# Patient Record
Sex: FEMALE | Race: WHITE | NOT HISPANIC OR LATINO | Employment: OTHER | URBAN - METROPOLITAN AREA
[De-identification: names, ages, dates, MRNs, and addresses within clinical notes are randomized per-mention and may not be internally consistent; named-entity substitution may affect disease eponyms.]

---

## 2023-02-11 PROBLEM — H02.839 DERMATOCHALASIA: Status: ACTIVE | Noted: 2023-02-11

## 2023-02-11 PROBLEM — R63.4 WEIGHT LOSS: Status: ACTIVE | Noted: 2023-02-11

## 2023-02-11 PROBLEM — J31.0 CHRONIC RHINITIS: Status: ACTIVE | Noted: 2023-02-11

## 2023-02-11 PROBLEM — R09.02 HYPOXIA: Status: ACTIVE | Noted: 2023-02-11

## 2023-02-11 PROBLEM — R07.9 CHEST PAIN AT REST: Status: ACTIVE | Noted: 2023-02-11

## 2023-02-11 PROBLEM — J34.89 CONCHA BULLOSA: Status: ACTIVE | Noted: 2023-02-11

## 2023-02-11 PROBLEM — R41.3 MEMORY LOSS: Status: ACTIVE | Noted: 2023-02-11

## 2023-02-11 PROBLEM — J44.9 COPD (CHRONIC OBSTRUCTIVE PULMONARY DISEASE) (MULTI): Status: ACTIVE | Noted: 2023-02-11

## 2023-02-11 PROBLEM — J18.9 PNEUMONIA: Status: ACTIVE | Noted: 2023-02-11

## 2023-02-11 PROBLEM — E87.1 HYPONATREMIA: Status: ACTIVE | Noted: 2023-02-11

## 2023-02-11 PROBLEM — R00.2 PALPITATIONS: Status: ACTIVE | Noted: 2023-02-11

## 2023-02-11 PROBLEM — K21.9 LPRD (LARYNGOPHARYNGEAL REFLUX DISEASE): Status: ACTIVE | Noted: 2023-02-11

## 2023-02-11 PROBLEM — R09.81 NASAL CONGESTION: Status: ACTIVE | Noted: 2023-02-11

## 2023-02-11 PROBLEM — E83.52 HYPERCALCEMIA: Status: ACTIVE | Noted: 2023-02-11

## 2023-02-11 PROBLEM — E83.42 HYPOMAGNESEMIA: Status: ACTIVE | Noted: 2023-02-11

## 2023-02-11 PROBLEM — E55.9 VITAMIN D DEFICIENCY: Status: ACTIVE | Noted: 2023-02-11

## 2023-02-11 PROBLEM — I44.7 LBBB (LEFT BUNDLE BRANCH BLOCK): Status: ACTIVE | Noted: 2023-02-11

## 2023-02-11 PROBLEM — M81.0 OSTEOPOROSIS: Status: ACTIVE | Noted: 2023-02-11

## 2023-02-11 PROBLEM — M10.9 GOUT: Status: ACTIVE | Noted: 2023-02-11

## 2023-02-11 PROBLEM — E78.5 HYPERLIPIDEMIA: Status: ACTIVE | Noted: 2023-02-11

## 2023-02-11 PROBLEM — R91.8 GROUND GLASS OPACITY PRESENT ON IMAGING OF LUNG: Status: ACTIVE | Noted: 2023-02-11

## 2023-02-11 PROBLEM — I10 HYPERTENSION: Status: ACTIVE | Noted: 2023-02-11

## 2023-02-11 PROBLEM — L85.3 DRY SKIN DERMATITIS: Status: ACTIVE | Noted: 2023-02-11

## 2023-02-11 PROBLEM — R06.02 SOB (SHORTNESS OF BREATH) ON EXERTION: Status: ACTIVE | Noted: 2023-02-11

## 2023-02-11 PROBLEM — R73.09 ELEVATED GLUCOSE: Status: ACTIVE | Noted: 2023-02-11

## 2023-02-11 PROBLEM — G47.33 OSA (OBSTRUCTIVE SLEEP APNEA): Status: ACTIVE | Noted: 2023-02-11

## 2023-02-11 PROBLEM — I65.23 ATHEROSCLEROSIS OF BOTH CAROTID ARTERIES: Status: ACTIVE | Noted: 2023-02-11

## 2023-02-11 PROBLEM — S81.801A WOUND OF RIGHT LEG: Status: ACTIVE | Noted: 2023-02-11

## 2023-03-08 ENCOUNTER — OFFICE VISIT (OUTPATIENT)
Dept: PRIMARY CARE | Facility: CLINIC | Age: 83
End: 2023-03-08
Payer: MEDICARE

## 2023-03-08 VITALS
BODY MASS INDEX: 18.46 KG/M2 | TEMPERATURE: 97.6 F | WEIGHT: 104.2 LBS | SYSTOLIC BLOOD PRESSURE: 130 MMHG | DIASTOLIC BLOOD PRESSURE: 80 MMHG

## 2023-03-08 DIAGNOSIS — M25.512 CHRONIC LEFT SHOULDER PAIN: ICD-10-CM

## 2023-03-08 DIAGNOSIS — R73.09 ELEVATED GLUCOSE: ICD-10-CM

## 2023-03-08 DIAGNOSIS — R63.4 WEIGHT LOSS: ICD-10-CM

## 2023-03-08 DIAGNOSIS — G89.29 CHRONIC LEFT SHOULDER PAIN: ICD-10-CM

## 2023-03-08 DIAGNOSIS — R06.02 SOB (SHORTNESS OF BREATH) ON EXERTION: ICD-10-CM

## 2023-03-08 DIAGNOSIS — I10 HYPERTENSION, UNSPECIFIED TYPE: ICD-10-CM

## 2023-03-08 DIAGNOSIS — R09.02 HYPOXIA: ICD-10-CM

## 2023-03-08 DIAGNOSIS — M1A.9XX0 CHRONIC GOUT WITHOUT TOPHUS, UNSPECIFIED CAUSE, UNSPECIFIED SITE: ICD-10-CM

## 2023-03-08 DIAGNOSIS — J44.9 CHRONIC OBSTRUCTIVE PULMONARY DISEASE, UNSPECIFIED COPD TYPE (MULTI): ICD-10-CM

## 2023-03-08 DIAGNOSIS — E46 PROTEIN-CALORIE MALNUTRITION, UNSPECIFIED SEVERITY (MULTI): Primary | ICD-10-CM

## 2023-03-08 PROBLEM — R00.2 PALPITATIONS: Status: RESOLVED | Noted: 2023-02-11 | Resolved: 2023-03-08

## 2023-03-08 PROBLEM — H02.839 DERMATOCHALASIA: Status: RESOLVED | Noted: 2023-02-11 | Resolved: 2023-03-08

## 2023-03-08 PROBLEM — R07.9 CHEST PAIN AT REST: Status: RESOLVED | Noted: 2023-02-11 | Resolved: 2023-03-08

## 2023-03-08 PROBLEM — J31.0 CHRONIC RHINITIS: Status: RESOLVED | Noted: 2023-02-11 | Resolved: 2023-03-08

## 2023-03-08 PROBLEM — S81.801A WOUND OF RIGHT LEG: Status: RESOLVED | Noted: 2023-02-11 | Resolved: 2023-03-08

## 2023-03-08 PROCEDURE — 3075F SYST BP GE 130 - 139MM HG: CPT | Performed by: INTERNAL MEDICINE

## 2023-03-08 PROCEDURE — 1160F RVW MEDS BY RX/DR IN RCRD: CPT | Performed by: INTERNAL MEDICINE

## 2023-03-08 PROCEDURE — 99213 OFFICE O/P EST LOW 20 MIN: CPT | Performed by: INTERNAL MEDICINE

## 2023-03-08 PROCEDURE — 1159F MED LIST DOCD IN RCRD: CPT | Performed by: INTERNAL MEDICINE

## 2023-03-08 PROCEDURE — 1036F TOBACCO NON-USER: CPT | Performed by: INTERNAL MEDICINE

## 2023-03-08 PROCEDURE — 3079F DIAST BP 80-89 MM HG: CPT | Performed by: INTERNAL MEDICINE

## 2023-03-08 NOTE — PROGRESS NOTES
Subjective   Patient ID: Jackie Matthews is a 82 y.o. female who presents for Follow-up (1 MONTH FU).    HPI     Review of Systems   All other systems reviewed and are negative.      Objective   /80   Temp 36.4 °C (97.6 °F)   Wt 47.3 kg (104 lb 3.2 oz)   BMI 18.46 kg/m²     Physical Exam    Assessment/Plan         82-year-old woman presenting with constellation of symptoms. Very poor historian, difficult to ascertain what she is specifically complaining of. Certainly a component of dyspnea as well as weight loss and loss of appetite. Wt a little better. CASTILLO better, on Advair x 4 weeks, however self discontinued LABA.  We will resume LABA and follow-up 6 to 8 weeks.  rx to pt. reviewed. F/up . BP good w/ amlodipine 5 mg. Reviewed with patient and daughter. Unable to tolerate mirtazapine --> ? why, she will likely try again (thinks she actually didn't try). Normal laboratories. Echocardiogram relatively normal. Asked patient to go to the ED any new or progressive symptoms.  con't Ensure bid.     From previous notes:  #1 PNA- resolved, follow-up CT per pulmonology  #2 hyponatremia- normal on retest  #3 hypomag- normal  #4 COPD-albuterol as needed. Resume LABA.  Continue Advair. Follow-up pulmonology  #5 EtOH-reviewed importance of cessation. remains off.   #6 thrombocytosis- resolved  #7 hearing loss/nasal obstruction-consult ENT  #8 severe JACLYN- f/u sleep Med, stressed importance and risk of untreated JACLYN  #9 dysgusia-reviewed.  Recommend ENT evaluation, patient declines.    #1 osteoporosis-began rx 11/18. reviewed. Given fracture with osteopenia => osteoporosis. Discussed options. We will con't alendronate and continue for 5 years. Spent significant time reviewing risks of this medicine including insufficiency fracture. retest DEXA 2 yrs  #2 impaired fasting blood sugar-reviewed at length. Stressed importance of routine regular exercise, low sugar reduced carbohydrate diet. Recheck   #3 vitamin D deficiency-  vitamin D 2000 units over-the-counter daily. Reviewed  #4 gout-rare episodes. Treated with colchicine. Consider allopurinol in the future.  #5 hyperlipidemia- fair. Diet and exercise. Retest  #6 carotid artery stenosis-consider follow-up scan 8/23  #7 hypertension-good control.   #8 left bundle branch block-normal stress test   #9 painful foot/hammer toe- c/s ortho  #10 h/o pelvic fx- resolved. no pain.   #11 LLL PNA- resolved  #12 hyponatremia-resolved  #13 palpitations- resolved.   #14 mild AI- f/u echo 1 year  #15 memory impairment- much improved w/ d/c EtOH. rec neuro eval, she declines today  Has order at home for shingles vaccine--> reviewed importance  Last mammogram 5/22  f/u 1 mth    covid booster pending, reviewed

## 2023-03-16 DIAGNOSIS — I10 PRIMARY HYPERTENSION: Primary | ICD-10-CM

## 2023-03-16 RX ORDER — METOPROLOL SUCCINATE 25 MG/1
TABLET, EXTENDED RELEASE ORAL
Qty: 45 TABLET | Refills: 0 | Status: SHIPPED | OUTPATIENT
Start: 2023-03-16 | End: 2023-07-27

## 2023-04-08 DIAGNOSIS — M85.80 OSTEOPENIA, UNSPECIFIED LOCATION: Primary | ICD-10-CM

## 2023-04-10 RX ORDER — ALENDRONATE SODIUM 70 MG/1
TABLET ORAL
Qty: 12 TABLET | Refills: 0 | Status: SHIPPED | OUTPATIENT
Start: 2023-04-10 | End: 2023-07-05

## 2023-05-13 DIAGNOSIS — E78.5 HYPERLIPIDEMIA, UNSPECIFIED HYPERLIPIDEMIA TYPE: Primary | ICD-10-CM

## 2023-05-15 RX ORDER — PRAVASTATIN SODIUM 20 MG/1
TABLET ORAL
Qty: 90 TABLET | Refills: 0 | Status: SHIPPED | OUTPATIENT
Start: 2023-05-15 | End: 2024-02-13

## 2023-05-26 DIAGNOSIS — I10 PRIMARY HYPERTENSION: Primary | ICD-10-CM

## 2023-05-26 RX ORDER — AMLODIPINE BESYLATE 10 MG/1
TABLET ORAL
Qty: 90 TABLET | Refills: 0 | Status: SHIPPED | OUTPATIENT
Start: 2023-05-26 | End: 2024-04-02

## 2023-06-08 ENCOUNTER — OFFICE VISIT (OUTPATIENT)
Dept: PRIMARY CARE | Facility: CLINIC | Age: 83
End: 2023-06-08
Payer: MEDICARE

## 2023-06-08 VITALS
SYSTOLIC BLOOD PRESSURE: 122 MMHG | TEMPERATURE: 97.8 F | DIASTOLIC BLOOD PRESSURE: 80 MMHG | WEIGHT: 106.8 LBS | BODY MASS INDEX: 18.92 KG/M2

## 2023-06-08 DIAGNOSIS — E78.5 HYPERLIPIDEMIA, UNSPECIFIED HYPERLIPIDEMIA TYPE: ICD-10-CM

## 2023-06-08 DIAGNOSIS — R06.02 SOB (SHORTNESS OF BREATH) ON EXERTION: ICD-10-CM

## 2023-06-08 DIAGNOSIS — E87.1 HYPONATREMIA: ICD-10-CM

## 2023-06-08 DIAGNOSIS — R73.09 ELEVATED GLUCOSE: ICD-10-CM

## 2023-06-08 DIAGNOSIS — R91.8 GROUND GLASS OPACITY PRESENT ON IMAGING OF LUNG: Primary | ICD-10-CM

## 2023-06-08 DIAGNOSIS — R41.3 MEMORY LOSS: ICD-10-CM

## 2023-06-08 DIAGNOSIS — J44.9 CHRONIC OBSTRUCTIVE PULMONARY DISEASE, UNSPECIFIED COPD TYPE (MULTI): ICD-10-CM

## 2023-06-08 PROCEDURE — 1036F TOBACCO NON-USER: CPT | Performed by: INTERNAL MEDICINE

## 2023-06-08 PROCEDURE — 99213 OFFICE O/P EST LOW 20 MIN: CPT | Performed by: INTERNAL MEDICINE

## 2023-06-08 PROCEDURE — 1159F MED LIST DOCD IN RCRD: CPT | Performed by: INTERNAL MEDICINE

## 2023-06-08 PROCEDURE — 1160F RVW MEDS BY RX/DR IN RCRD: CPT | Performed by: INTERNAL MEDICINE

## 2023-06-08 PROCEDURE — 3079F DIAST BP 80-89 MM HG: CPT | Performed by: INTERNAL MEDICINE

## 2023-06-08 PROCEDURE — 3074F SYST BP LT 130 MM HG: CPT | Performed by: INTERNAL MEDICINE

## 2023-06-08 ASSESSMENT — PATIENT HEALTH QUESTIONNAIRE - PHQ9
2. FEELING DOWN, DEPRESSED OR HOPELESS: NOT AT ALL
SUM OF ALL RESPONSES TO PHQ9 QUESTIONS 1 AND 2: 0
1. LITTLE INTEREST OR PLEASURE IN DOING THINGS: NOT AT ALL

## 2023-06-08 NOTE — PROGRESS NOTES
"Subjective   Patient ID: Jackie Matthews is a 82 y.o. female who presents for Follow-up.    HPI   Overall feels well.  Note dated 3/8/2023 reviewed.  Feels much improved.  Increased exercise.  Increased activity.  No shortness of breath.  Self discontinued Breo Ellipta.  Denies any shortness of breath.  Has follow-up pending/do with pulmonologist.  Mood overall good.  Feels memory has improved.  Appetite improved.  In general feels \"great \".  Review of Systems   All other systems reviewed and are negative.      Objective   /80   Temp 36.6 °C (97.8 °F)   Wt 48.4 kg (106 lb 12.8 oz)   BMI 18.92 kg/m²     Physical Exam  Constitutional:       Appearance: Normal appearance.   Cardiovascular:      Rate and Rhythm: Normal rate and regular rhythm.      Pulses: Normal pulses.      Heart sounds: Normal heart sounds. No murmur heard.  Pulmonary:      Effort: Pulmonary effort is normal. No respiratory distress.      Breath sounds: Normal breath sounds. No wheezing, rhonchi or rales.   Neurological:      Mental Status: She is alert.   Psychiatric:         Mood and Affect: Mood normal.         Behavior: Behavior normal.         Thought Content: Thought content normal.         Judgment: Judgment normal.         Assessment/Plan         Very poor historian, difficult to ascertain what she is specifically complaining of. Certainly a component of dyspnea as well as weight loss and loss of appetite. Wt a little better. CASTILLO better, on Advair x 4 weeks, however self discontinued LABA.  We will resume LABA and follow-up 6 to 8 weeks.  rx to pt. reviewed. F/up . BP good w/ amlodipine 5 mg. Reviewed with patient and daughter. Unable to tolerate mirtazapine --> ? why, she will likely try again (thinks she actually didn't try). Normal laboratories. Echocardiogram relatively normal. Asked patient to go to the ED any new or progressive symptoms.  con't Ensure bid.     From previous notes:  #1 PNA- resolved, follow-up CT per " pulmonology due.  I reviewed need w/ pt.  She will call and schedule.  Stressed importance.   #2 hyponatremia- normal on retest  #3 hypomag- normal  #4 COPD-albuterol as needed. Resume LABA.  Continue Advair. Follow-up pulmonology, asked pt to sched f/u  appt  #5 EtOH-reviewed importance of cessation. remains off.   #6 thrombocytosis- resolved  #7 hearing loss/nasal obstruction-consult ENT  #8 severe JACLYN- f/u sleep Med, stressed importance and risk of untreated JACLYN  #9 dysgusia-reviewed.  Recommend ENT evaluation, patient declines.    #1 osteoporosis-began rx 11/18. reviewed. Given fracture with osteopenia => osteoporosis. Discussed options. We will con't alendronate and continue for 5 years. Spent significant time reviewing risks of this medicine including insufficiency fracture. retest DEXA 2 yrs  #2 impaired fasting blood sugar-reviewed at length. Stressed importance of routine regular exercise, low sugar reduced carbohydrate diet. Recheck   #3 vitamin D deficiency- vitamin D 2000 units over-the-counter daily. Reviewed  #4 gout-rare episodes. Treated with colchicine. Consider allopurinol in the future.  #5 hyperlipidemia- fair. Diet and exercise. Retest  #6 carotid artery stenosis-consider follow-up scan 8/23  #7 hypertension-good control.   #8 left bundle branch block-normal stress test   #9 painful foot/hammer toe- c/s ortho  #10 h/o pelvic fx- resolved. no pain.   #11 LLL PNA- resolved  #12 hyponatremia-resolved  #13 palpitations- resolved.   #14 mild AI- f/u echo 1 year  #15 memory impairment- much improved w/ d/c EtOH. rec neuro eval, she declines today  Has order at home for shingles vaccine--> reviewed importance  Last mammogram 5/22  f/u 1 mth    covid booster pending, reviewed

## 2023-06-08 NOTE — PATIENT INSTRUCTIONS
Please schedule the f/u  chest CT scan that your pulmonologist ordered.    Lets get back together in about 3 months

## 2023-06-15 DIAGNOSIS — J44.9 CHRONIC OBSTRUCTIVE PULMONARY DISEASE, UNSPECIFIED COPD TYPE (MULTI): Primary | ICD-10-CM

## 2023-06-15 RX ORDER — ALBUTEROL SULFATE 90 UG/1
AEROSOL, METERED RESPIRATORY (INHALATION)
Qty: 25.5 G | Refills: 0 | Status: SHIPPED | OUTPATIENT
Start: 2023-06-15 | End: 2023-09-11

## 2023-07-05 DIAGNOSIS — M85.80 OSTEOPENIA, UNSPECIFIED LOCATION: ICD-10-CM

## 2023-07-05 RX ORDER — ALENDRONATE SODIUM 70 MG/1
TABLET ORAL
Qty: 12 TABLET | Refills: 0 | Status: SHIPPED | OUTPATIENT
Start: 2023-07-05 | End: 2023-09-26

## 2023-07-24 ENCOUNTER — TELEPHONE (OUTPATIENT)
Dept: PRIMARY CARE | Facility: CLINIC | Age: 83
End: 2023-07-24

## 2023-07-24 NOTE — TELEPHONE ENCOUNTER
Patient called with concerns of a rash on her neck, going into her ear, and down her left arm. I spoke with Dr. Brunner and he advised that the patient seek care at the emergency room. I am trying to reach the patient to let her know this.- I left a message for her to call the office.

## 2023-07-25 ENCOUNTER — OFFICE VISIT (OUTPATIENT)
Dept: PRIMARY CARE | Facility: CLINIC | Age: 83
End: 2023-07-25
Payer: MEDICARE

## 2023-07-25 VITALS
WEIGHT: 106.6 LBS | TEMPERATURE: 98.2 F | SYSTOLIC BLOOD PRESSURE: 144 MMHG | DIASTOLIC BLOOD PRESSURE: 74 MMHG | BODY MASS INDEX: 18.88 KG/M2

## 2023-07-25 DIAGNOSIS — J44.9 CHRONIC OBSTRUCTIVE PULMONARY DISEASE, UNSPECIFIED COPD TYPE (MULTI): ICD-10-CM

## 2023-07-25 DIAGNOSIS — L30.9 DERMATITIS: Primary | ICD-10-CM

## 2023-07-25 PROCEDURE — 3078F DIAST BP <80 MM HG: CPT | Performed by: INTERNAL MEDICINE

## 2023-07-25 PROCEDURE — 99213 OFFICE O/P EST LOW 20 MIN: CPT | Performed by: INTERNAL MEDICINE

## 2023-07-25 PROCEDURE — 3077F SYST BP >= 140 MM HG: CPT | Performed by: INTERNAL MEDICINE

## 2023-07-25 PROCEDURE — 1126F AMNT PAIN NOTED NONE PRSNT: CPT | Performed by: INTERNAL MEDICINE

## 2023-07-25 PROCEDURE — 1159F MED LIST DOCD IN RCRD: CPT | Performed by: INTERNAL MEDICINE

## 2023-07-25 PROCEDURE — 1160F RVW MEDS BY RX/DR IN RCRD: CPT | Performed by: INTERNAL MEDICINE

## 2023-07-25 PROCEDURE — 1036F TOBACCO NON-USER: CPT | Performed by: INTERNAL MEDICINE

## 2023-07-25 RX ORDER — MIRTAZAPINE 7.5 MG/1
7.5 TABLET, FILM COATED ORAL NIGHTLY
COMMUNITY
Start: 2023-01-18 | End: 2023-07-25 | Stop reason: ALTCHOICE

## 2023-07-26 RX ORDER — METHYLPREDNISOLONE 4 MG/1
TABLET ORAL
Qty: 21 TABLET | Refills: 0 | Status: SHIPPED | OUTPATIENT
Start: 2023-07-26 | End: 2023-08-02

## 2023-07-26 RX ORDER — TRIAMCINOLONE ACETONIDE 1 MG/G
CREAM TOPICAL 2 TIMES DAILY
Qty: 30 G | Refills: 5 | Status: SHIPPED | OUTPATIENT
Start: 2023-07-26 | End: 2024-06-04 | Stop reason: ALTCHOICE

## 2023-07-26 NOTE — PROGRESS NOTES
Subjective   Patient ID: Jackie Matthews is a 82 y.o. female who presents for Rash (Face, arms, neck).    HPI     Review of Systems    Objective   /74 (BP Location: Right arm, Patient Position: Sitting, BP Cuff Size: Adult)   Temp 36.8 °C (98.2 °F) (Skin)   Wt 48.4 kg (106 lb 9.6 oz)   BMI 18.88 kg/m²     Physical Exam    Assessment/Plan   Problem List Items Addressed This Visit       COPD (chronic obstructive pulmonary disease) (CMS/[facility])

## 2023-07-27 DIAGNOSIS — I10 PRIMARY HYPERTENSION: ICD-10-CM

## 2023-07-27 RX ORDER — METOPROLOL SUCCINATE 25 MG/1
12.5 TABLET, EXTENDED RELEASE ORAL DAILY
Qty: 45 TABLET | Refills: 0 | Status: SHIPPED | OUTPATIENT
Start: 2023-07-27 | End: 2023-11-13

## 2023-09-11 DIAGNOSIS — J44.9 CHRONIC OBSTRUCTIVE PULMONARY DISEASE, UNSPECIFIED COPD TYPE (MULTI): ICD-10-CM

## 2023-09-11 RX ORDER — ALBUTEROL SULFATE 90 UG/1
AEROSOL, METERED RESPIRATORY (INHALATION)
Qty: 25.5 G | Refills: 0 | Status: SHIPPED | OUTPATIENT
Start: 2023-09-11

## 2023-09-19 ENCOUNTER — APPOINTMENT (OUTPATIENT)
Dept: PRIMARY CARE | Facility: CLINIC | Age: 83
End: 2023-09-19
Payer: MEDICARE

## 2023-09-19 ENCOUNTER — OFFICE VISIT (OUTPATIENT)
Dept: PRIMARY CARE | Facility: CLINIC | Age: 83
End: 2023-09-19
Payer: MEDICARE

## 2023-09-19 DIAGNOSIS — I65.23 ATHEROSCLEROSIS OF BOTH CAROTID ARTERIES: Primary | ICD-10-CM

## 2023-09-19 DIAGNOSIS — E78.5 HYPERLIPIDEMIA, UNSPECIFIED HYPERLIPIDEMIA TYPE: ICD-10-CM

## 2023-09-19 DIAGNOSIS — Z23 IMMUNIZATION DUE: ICD-10-CM

## 2023-09-19 DIAGNOSIS — R73.09 ELEVATED GLUCOSE: ICD-10-CM

## 2023-09-19 DIAGNOSIS — Z12.39 BREAST SCREENING: ICD-10-CM

## 2023-09-19 DIAGNOSIS — Z12.31 ENCOUNTER FOR SCREENING MAMMOGRAM FOR MALIGNANT NEOPLASM OF BREAST: ICD-10-CM

## 2023-09-19 DIAGNOSIS — Z00.00 REGULAR CHECK-UP: ICD-10-CM

## 2023-09-19 DIAGNOSIS — I10 HYPERTENSION, UNSPECIFIED TYPE: ICD-10-CM

## 2023-09-19 PROCEDURE — 1160F RVW MEDS BY RX/DR IN RCRD: CPT | Performed by: INTERNAL MEDICINE

## 2023-09-19 PROCEDURE — 1126F AMNT PAIN NOTED NONE PRSNT: CPT | Performed by: INTERNAL MEDICINE

## 2023-09-19 PROCEDURE — 1159F MED LIST DOCD IN RCRD: CPT | Performed by: INTERNAL MEDICINE

## 2023-09-19 PROCEDURE — 3079F DIAST BP 80-89 MM HG: CPT | Performed by: INTERNAL MEDICINE

## 2023-09-19 PROCEDURE — 99213 OFFICE O/P EST LOW 20 MIN: CPT | Performed by: INTERNAL MEDICINE

## 2023-09-19 PROCEDURE — 90662 IIV NO PRSV INCREASED AG IM: CPT | Performed by: INTERNAL MEDICINE

## 2023-09-19 PROCEDURE — 1036F TOBACCO NON-USER: CPT | Performed by: INTERNAL MEDICINE

## 2023-09-19 PROCEDURE — G0008 ADMIN INFLUENZA VIRUS VAC: HCPCS | Performed by: INTERNAL MEDICINE

## 2023-09-19 PROCEDURE — G0439 PPPS, SUBSEQ VISIT: HCPCS | Performed by: INTERNAL MEDICINE

## 2023-09-19 PROCEDURE — 1170F FXNL STATUS ASSESSED: CPT | Performed by: INTERNAL MEDICINE

## 2023-09-19 PROCEDURE — 3075F SYST BP GE 130 - 139MM HG: CPT | Performed by: INTERNAL MEDICINE

## 2023-09-19 ASSESSMENT — ENCOUNTER SYMPTOMS
DEPRESSION: 0
LOSS OF SENSATION IN FEET: 0
OCCASIONAL FEELINGS OF UNSTEADINESS: 0

## 2023-09-19 NOTE — PROGRESS NOTES
Subjective   Reason for Visit: Jackie Matthews is an 82 y.o. female here for a Medicare Wellness visit.          Reviewed all medications by prescribing practitioner or clinical pharmacist (such as prescriptions, OTCs, herbal therapies and supplements) and documented in the medical record.    HPI  Overall well.  No shortness of breath.  Energy good.  No real cough.  Following with pulmonology.  Active.  Appetite good, weight trending up.  Patient Care Team:  Ana Rosa Brunner MD as PCP - General  Ana Rosa Brunner MD as PCP - [employer] PCP     Review of Systems   All other systems reviewed and are negative.      Objective   Vitals:  /84 (BP Location: Left arm, Patient Position: Sitting, BP Cuff Size: Adult)   Temp 36.4 °C (97.5 °F) (Skin)   Wt 50.9 kg (112 lb 3.2 oz)   BMI 19.88 kg/m²       Physical Exam  Constitutional:       Appearance: Normal appearance.   Cardiovascular:      Rate and Rhythm: Normal rate and regular rhythm.      Pulses: Normal pulses.      Heart sounds: Normal heart sounds. No murmur heard.  Pulmonary:      Effort: Pulmonary effort is normal. No respiratory distress.      Breath sounds: Normal breath sounds. No wheezing, rhonchi or rales.   Neurological:      Mental Status: She is alert.   Psychiatric:         Mood and Affect: Mood normal.         Behavior: Behavior normal.         Thought Content: Thought content normal.         Judgment: Judgment normal.         Assessment/Plan   Problem List Items Addressed This Visit    None  Visit Diagnoses       Immunization due              #1 PNA- resolved.     #2 hyponatremia- normal on retest  #3 hypomag- normal  #4 COPD-albuterol as needed.  Continue LABA.  Continue Advair. Follow-up pulmonology, asked pt to sched f/u  appt  #5 EtOH-reviewed importance of cessation. remains off.   #6 thrombocytosis- resolved, retest  #7 hearing loss/nasal obstruction-consult ENT  #8 severe JACLYN- f/u sleep Med, stressed importance and risk of untreated  JACLYN  #9 dysgusia-reviewed.  Recommend ENT evaluation, patient declines.     #1 osteoporosis-began rx 11/18. reviewed. Given fracture with osteopenia => osteoporosis. Discussed options. We will con't alendronate and continue for 5 years. Spent significant time reviewing risks of this medicine including insufficiency fracture. retest DEXA 2 yrs  #2 impaired fasting blood sugar-reviewed at length. Stressed importance of routine regular exercise, low sugar reduced carbohydrate diet. Recheck   #3 vitamin D deficiency- vitamin D 2000 units over-the-counter daily. Reviewed  #4 gout-rare episodes. Treated with colchicine. Consider allopurinol in the future.  #5 hyperlipidemia- fair. Diet and exercise. Retest  #6 carotid artery stenosis- follow-up scan   #7 hypertension-good control.   #8 left bundle branch block-normal stress test   #9 painful foot/hammer toe- c/s ortho  #10 h/o pelvic fx- resolved. no pain.   #11  Pulmonary nodule-status post antibiotics.  Follow-up CT scan as per pulmonology.  Reviewed importance of follow-up with pulmonology, due early November.  Reviewed with patient.    #12 hyponatremia-resolved  #13 palpitations- resolved.   #14 mild AI- f/u echo 1 year  #15 memory impairment- much improved w/ d/c EtOH. rec neuro eval, she declines today  Has order at home for shingles vaccine--> reviewed importance  Last mammogram 5/22  f/u 1 mth     covid booster pending, reviewed

## 2023-09-20 VITALS
BODY MASS INDEX: 19.88 KG/M2 | DIASTOLIC BLOOD PRESSURE: 84 MMHG | TEMPERATURE: 97.5 F | WEIGHT: 112.2 LBS | SYSTOLIC BLOOD PRESSURE: 138 MMHG

## 2023-09-20 ASSESSMENT — PATIENT HEALTH QUESTIONNAIRE - PHQ9
SUM OF ALL RESPONSES TO PHQ9 QUESTIONS 1 AND 2: 0
2. FEELING DOWN, DEPRESSED OR HOPELESS: NOT AT ALL
1. LITTLE INTEREST OR PLEASURE IN DOING THINGS: NOT AT ALL

## 2023-09-20 ASSESSMENT — ACTIVITIES OF DAILY LIVING (ADL)
MANAGING_FINANCES: INDEPENDENT
TAKING_MEDICATION: INDEPENDENT
DRESSING: INDEPENDENT
BATHING: INDEPENDENT
DOING_HOUSEWORK: INDEPENDENT
GROCERY_SHOPPING: INDEPENDENT

## 2023-09-22 ENCOUNTER — LAB (OUTPATIENT)
Dept: LAB | Facility: LAB | Age: 83
End: 2023-09-22
Payer: MEDICARE

## 2023-09-22 DIAGNOSIS — R73.09 ELEVATED GLUCOSE: ICD-10-CM

## 2023-09-22 DIAGNOSIS — I10 HYPERTENSION, UNSPECIFIED TYPE: ICD-10-CM

## 2023-09-22 DIAGNOSIS — E78.5 HYPERLIPIDEMIA, UNSPECIFIED HYPERLIPIDEMIA TYPE: ICD-10-CM

## 2023-09-22 LAB
ALANINE AMINOTRANSFERASE (SGPT) (U/L) IN SER/PLAS: 16 U/L (ref 7–45)
ANION GAP IN SER/PLAS: 13 MMOL/L (ref 10–20)
CALCIUM (MG/DL) IN SER/PLAS: 10 MG/DL (ref 8.6–10.3)
CARBON DIOXIDE, TOTAL (MMOL/L) IN SER/PLAS: 30 MMOL/L (ref 21–32)
CHLORIDE (MMOL/L) IN SER/PLAS: 97 MMOL/L (ref 98–107)
CHOLESTEROL (MG/DL) IN SER/PLAS: 199 MG/DL (ref 0–199)
CHOLESTEROL IN HDL (MG/DL) IN SER/PLAS: 96.3 MG/DL
CHOLESTEROL/HDL RATIO: 2.1
CREATININE (MG/DL) IN SER/PLAS: 0.74 MG/DL (ref 0.5–1.05)
ERYTHROCYTE DISTRIBUTION WIDTH (RATIO) BY AUTOMATED COUNT: 12.5 % (ref 11.5–14.5)
ERYTHROCYTE MEAN CORPUSCULAR HEMOGLOBIN CONCENTRATION (G/DL) BY AUTOMATED: 32.4 G/DL (ref 32–36)
ERYTHROCYTE MEAN CORPUSCULAR VOLUME (FL) BY AUTOMATED COUNT: 95 FL (ref 80–100)
ERYTHROCYTES (10*6/UL) IN BLOOD BY AUTOMATED COUNT: 4.16 X10E12/L (ref 4–5.2)
GFR FEMALE: 80 ML/MIN/1.73M2
GLUCOSE (MG/DL) IN SER/PLAS: 107 MG/DL (ref 74–99)
HEMATOCRIT (%) IN BLOOD BY AUTOMATED COUNT: 39.5 % (ref 36–46)
HEMOGLOBIN (G/DL) IN BLOOD: 12.8 G/DL (ref 12–16)
LDL: 88 MG/DL (ref 0–99)
LEUKOCYTES (10*3/UL) IN BLOOD BY AUTOMATED COUNT: 6.5 X10E9/L (ref 4.4–11.3)
PLATELETS (10*3/UL) IN BLOOD AUTOMATED COUNT: 280 X10E9/L (ref 150–450)
POTASSIUM (MMOL/L) IN SER/PLAS: 4.4 MMOL/L (ref 3.5–5.3)
SODIUM (MMOL/L) IN SER/PLAS: 136 MMOL/L (ref 136–145)
TRIGLYCERIDE (MG/DL) IN SER/PLAS: 76 MG/DL (ref 0–149)
UREA NITROGEN (MG/DL) IN SER/PLAS: 21 MG/DL (ref 6–23)
VLDL: 15 MG/DL (ref 0–40)

## 2023-09-22 PROCEDURE — 84460 ALANINE AMINO (ALT) (SGPT): CPT

## 2023-09-22 PROCEDURE — 80061 LIPID PANEL: CPT

## 2023-09-22 PROCEDURE — 83036 HEMOGLOBIN GLYCOSYLATED A1C: CPT

## 2023-09-22 PROCEDURE — 80048 BASIC METABOLIC PNL TOTAL CA: CPT

## 2023-09-22 PROCEDURE — 36415 COLL VENOUS BLD VENIPUNCTURE: CPT

## 2023-09-22 PROCEDURE — 85027 COMPLETE CBC AUTOMATED: CPT

## 2023-09-23 LAB
ESTIMATED AVERAGE GLUCOSE FOR HBA1C: 114 MG/DL
HEMOGLOBIN A1C/HEMOGLOBIN TOTAL IN BLOOD: 5.6 %

## 2023-10-30 ENCOUNTER — OFFICE VISIT (OUTPATIENT)
Dept: PULMONOLOGY | Facility: CLINIC | Age: 83
End: 2023-10-30
Payer: MEDICARE

## 2023-10-30 VITALS
HEIGHT: 63 IN | SYSTOLIC BLOOD PRESSURE: 178 MMHG | OXYGEN SATURATION: 98 % | HEART RATE: 61 BPM | BODY MASS INDEX: 20.91 KG/M2 | TEMPERATURE: 97.5 F | WEIGHT: 118 LBS | DIASTOLIC BLOOD PRESSURE: 75 MMHG

## 2023-10-30 DIAGNOSIS — R91.8 GROUND GLASS OPACITY PRESENT ON IMAGING OF LUNG: Primary | ICD-10-CM

## 2023-10-30 PROCEDURE — 1126F AMNT PAIN NOTED NONE PRSNT: CPT | Performed by: INTERNAL MEDICINE

## 2023-10-30 PROCEDURE — 99214 OFFICE O/P EST MOD 30 MIN: CPT | Performed by: INTERNAL MEDICINE

## 2023-10-30 PROCEDURE — 1160F RVW MEDS BY RX/DR IN RCRD: CPT | Performed by: INTERNAL MEDICINE

## 2023-10-30 PROCEDURE — 1036F TOBACCO NON-USER: CPT | Performed by: INTERNAL MEDICINE

## 2023-10-30 PROCEDURE — 3078F DIAST BP <80 MM HG: CPT | Performed by: INTERNAL MEDICINE

## 2023-10-30 PROCEDURE — 1159F MED LIST DOCD IN RCRD: CPT | Performed by: INTERNAL MEDICINE

## 2023-10-30 PROCEDURE — 3077F SYST BP >= 140 MM HG: CPT | Performed by: INTERNAL MEDICINE

## 2023-10-30 ASSESSMENT — ENCOUNTER SYMPTOMS
SINUS PAIN: 0
CONSTIPATION: 0
DIFFICULTY URINATING: 0
WHEEZING: 0
COUGH: 1
ABDOMINAL PAIN: 0
CHOKING: 0
EYE DISCHARGE: 0
SPEECH DIFFICULTY: 0
HEADACHES: 0
SHORTNESS OF BREATH: 0
NUMBNESS: 0
AGITATION: 0
SINUS PRESSURE: 0
JOINT SWELLING: 0
FREQUENCY: 0
DIZZINESS: 0
RHINORRHEA: 0
PALPITATIONS: 0
TREMORS: 0
STRIDOR: 0
ARTHRALGIAS: 0
DYSURIA: 0
EYE REDNESS: 0
UNEXPECTED WEIGHT CHANGE: 0
WEAKNESS: 0
LIGHT-HEADEDNESS: 0
NERVOUS/ANXIOUS: 0
FATIGUE: 0
APNEA: 0
SLEEP DISTURBANCE: 0
NAUSEA: 0
HEMATURIA: 0
BRUISES/BLEEDS EASILY: 0
FACIAL SWELLING: 0
FEVER: 0
ABDOMINAL DISTENTION: 0
ADENOPATHY: 0

## 2023-10-30 NOTE — PATIENT INSTRUCTIONS
I am going to obtain a follow-up CT scan of the chest and once results become available I will discuss them with the patient.  Call  to schedule

## 2023-11-12 DIAGNOSIS — I10 PRIMARY HYPERTENSION: ICD-10-CM

## 2023-11-13 RX ORDER — METOPROLOL SUCCINATE 25 MG/1
TABLET, EXTENDED RELEASE ORAL DAILY
Qty: 45 TABLET | Refills: 0 | Status: SHIPPED | OUTPATIENT
Start: 2023-11-13 | End: 2024-02-09

## 2023-11-14 ENCOUNTER — TELEPHONE (OUTPATIENT)
Dept: PULMONOLOGY | Facility: CLINIC | Age: 83
End: 2023-11-14
Payer: MEDICARE

## 2023-11-14 DIAGNOSIS — R91.8 GROUND GLASS OPACITY PRESENT ON IMAGING OF LUNG: Primary | ICD-10-CM

## 2023-11-14 NOTE — TELEPHONE ENCOUNTER
The patient was unclear of when she should have her repeat CT scan. She recently had one in August. Please call the daughter to advise. Thanks

## 2023-12-09 DIAGNOSIS — M85.80 OSTEOPENIA, UNSPECIFIED LOCATION: ICD-10-CM

## 2023-12-11 RX ORDER — ALENDRONATE SODIUM 70 MG/1
TABLET ORAL
Qty: 12 TABLET | Refills: 0 | Status: SHIPPED | OUTPATIENT
Start: 2023-12-11 | End: 2024-03-18

## 2023-12-18 ENCOUNTER — OFFICE VISIT (OUTPATIENT)
Dept: PRIMARY CARE | Facility: CLINIC | Age: 83
End: 2023-12-18
Payer: MEDICARE

## 2023-12-18 VITALS — SYSTOLIC BLOOD PRESSURE: 120 MMHG | WEIGHT: 120.4 LBS | DIASTOLIC BLOOD PRESSURE: 74 MMHG | BODY MASS INDEX: 21.33 KG/M2

## 2023-12-18 DIAGNOSIS — G47.33 OSA (OBSTRUCTIVE SLEEP APNEA): ICD-10-CM

## 2023-12-18 DIAGNOSIS — I10 HYPERTENSION, UNSPECIFIED TYPE: ICD-10-CM

## 2023-12-18 DIAGNOSIS — R91.8 GROUND GLASS OPACITY PRESENT ON IMAGING OF LUNG: ICD-10-CM

## 2023-12-18 DIAGNOSIS — I65.23 ATHEROSCLEROSIS OF BOTH CAROTID ARTERIES: Primary | ICD-10-CM

## 2023-12-18 DIAGNOSIS — M1A.9XX0 CHRONIC GOUT WITHOUT TOPHUS, UNSPECIFIED CAUSE, UNSPECIFIED SITE: ICD-10-CM

## 2023-12-18 PROCEDURE — 99214 OFFICE O/P EST MOD 30 MIN: CPT | Performed by: INTERNAL MEDICINE

## 2023-12-18 PROCEDURE — 1126F AMNT PAIN NOTED NONE PRSNT: CPT | Performed by: INTERNAL MEDICINE

## 2023-12-18 PROCEDURE — 1160F RVW MEDS BY RX/DR IN RCRD: CPT | Performed by: INTERNAL MEDICINE

## 2023-12-18 PROCEDURE — 3074F SYST BP LT 130 MM HG: CPT | Performed by: INTERNAL MEDICINE

## 2023-12-18 PROCEDURE — 3078F DIAST BP <80 MM HG: CPT | Performed by: INTERNAL MEDICINE

## 2023-12-18 PROCEDURE — 1036F TOBACCO NON-USER: CPT | Performed by: INTERNAL MEDICINE

## 2023-12-18 PROCEDURE — 1159F MED LIST DOCD IN RCRD: CPT | Performed by: INTERNAL MEDICINE

## 2023-12-18 ASSESSMENT — PATIENT HEALTH QUESTIONNAIRE - PHQ9
2. FEELING DOWN, DEPRESSED OR HOPELESS: NOT AT ALL
1. LITTLE INTEREST OR PLEASURE IN DOING THINGS: NOT AT ALL
SUM OF ALL RESPONSES TO PHQ9 QUESTIONS 1 AND 2: 0

## 2023-12-18 NOTE — PROGRESS NOTES
Subjective   Patient ID: Jackie Matthews is a 82 y.o. female who presents for Follow-up.    HPI     Overall well   Sleeping.  No recent falls.  No chest pain shortness of breath.  No cough.  Saw pulmonology, has not had follow-up CT scan.  No night sweats.  No shortness of breath or wheezing.  Admits to drinking 3+ alcoholic drinks per night.  Review of Systems   All other systems reviewed and are negative.      Objective   /74   Wt 54.6 kg (120 lb 6.4 oz)   BMI 21.33 kg/m²     Physical Exam  Constitutional:       Appearance: Normal appearance.   Cardiovascular:      Rate and Rhythm: Normal rate and regular rhythm.      Pulses: Normal pulses.      Heart sounds: Normal heart sounds. No murmur heard.  Pulmonary:      Effort: Pulmonary effort is normal. No respiratory distress.      Breath sounds: Normal breath sounds. No wheezing, rhonchi or rales.   Neurological:      Mental Status: She is alert.   Psychiatric:         Mood and Affect: Mood normal.         Behavior: Behavior normal.         Thought Content: Thought content normal.         Judgment: Judgment normal.         Lab Results   Component Value Date    WBC 6.5 09/22/2023    HGB 12.8 09/22/2023    HCT 39.5 09/22/2023     09/22/2023    CHOL 199 09/22/2023    TRIG 76 09/22/2023    HDL 96.3 09/22/2023    ALT 16 09/22/2023    AST 18 01/17/2023     09/22/2023    K 4.4 09/22/2023    CL 97 (L) 09/22/2023    CREATININE 0.74 09/22/2023    BUN 21 09/22/2023    CO2 30 09/22/2023    TSH 1.11 01/17/2023    PSA <0.10 01/26/2021    HGBA1C 5.6 09/22/2023       Assessment/Plan       #1 PNA- resolved.   no s/sx.  #2 hyponatremia- normal on retest  #3 hypomag- normal  #4 COPD-albuterol as needed.  Continue LABA.  Continue Advair. Follow-up pulmonology, asked pt to sched f/u  appt  #5 EtOH-reviewed importance of cessation. remains off.   #6 thrombocytosis- resolved, retest  #7 hearing loss/nasal obstruction-consult ENT  #8 severe JACLYN- f/u sleep Med, stressed  importance and risk of untreated JACLYN.  Using CPAP at bedtime.   #9 dysgusia-reviewed.  Recommend ENT evaluation, patient declines.     #1 osteoporosis-began rx 11/18. reviewed. Given fracture with osteopenia => osteoporosis. Discussed options. We will con't alendronate and continue for 5 years. Spent significant time reviewing risks of this medicine including insufficiency fracture. retest DEXA 2 yrs  #2 impaired fasting blood sugar-reviewed at length. Stressed importance of routine regular exercise, low sugar reduced carbohydrate diet. Recheck   #3 vitamin D deficiency- vitamin D 2000 units over-the-counter daily. Reviewed  #4 gout-rare episodes. Treated with colchicine. Consider allopurinol in the future.  #5 hyperlipidemia- fair. Diet and exercise. Retest  #6 carotid artery stenosis- follow-up scan   #7 hypertension-good control.   #8 left bundle branch block-normal stress test   #9 painful foot/hammer toe- c/s ortho  #10 h/o pelvic fx- resolved. no pain.   #11  Pulmonary nodule-status post antibiotics.  Follow-up CT scan as per pulmonology.    #12 hyponatremia-resolved  #13 palpitations- resolved.   #14 mild AI- f/u echo 1 year  #15 memory impairment-  improved.  Rec NO  EtOH. rec neuro eval again, she declines today  Has order at home for shingles vaccine--> reviewed importance  Last mammogram 5/22-- orders in chart, reviewed  f/u 3 mth

## 2023-12-18 NOTE — PATIENT INSTRUCTIONS
Please have your f/u  chest CT. Schedule your mammo.  Come back to see me in 3 mths.  I would recommend that you not use any alcohol.

## 2024-01-16 ENCOUNTER — ANCILLARY PROCEDURE (OUTPATIENT)
Dept: RADIOLOGY | Facility: CLINIC | Age: 84
End: 2024-01-16
Payer: MEDICARE

## 2024-01-16 DIAGNOSIS — R91.8 GROUND GLASS OPACITY PRESENT ON IMAGING OF LUNG: ICD-10-CM

## 2024-01-16 PROCEDURE — 71250 CT THORAX DX C-: CPT

## 2024-01-16 PROCEDURE — 71250 CT THORAX DX C-: CPT | Performed by: RADIOLOGY

## 2024-01-19 DIAGNOSIS — R91.1 LUNG NODULE: Primary | ICD-10-CM

## 2024-01-22 DIAGNOSIS — R92.8 ABNORMAL MAMMOGRAM: ICD-10-CM

## 2024-02-09 DIAGNOSIS — I10 PRIMARY HYPERTENSION: ICD-10-CM

## 2024-02-09 RX ORDER — METOPROLOL SUCCINATE 25 MG/1
12.5 TABLET, EXTENDED RELEASE ORAL DAILY
Qty: 45 TABLET | Refills: 0 | Status: SHIPPED | OUTPATIENT
Start: 2024-02-09 | End: 2024-05-14 | Stop reason: SDUPTHER

## 2024-02-13 DIAGNOSIS — E78.5 HYPERLIPIDEMIA, UNSPECIFIED HYPERLIPIDEMIA TYPE: ICD-10-CM

## 2024-02-13 DIAGNOSIS — J44.9 CHRONIC OBSTRUCTIVE PULMONARY DISEASE, UNSPECIFIED COPD TYPE (MULTI): Primary | ICD-10-CM

## 2024-02-13 RX ORDER — PRAVASTATIN SODIUM 20 MG/1
TABLET ORAL
Qty: 90 TABLET | Refills: 0 | Status: SHIPPED | OUTPATIENT
Start: 2024-02-13 | End: 2024-05-14 | Stop reason: SDUPTHER

## 2024-02-13 RX ORDER — FLUTICASONE PROPIONATE AND SALMETEROL 250; 50 UG/1; UG/1
1 POWDER RESPIRATORY (INHALATION) 2 TIMES DAILY
Qty: 60 EACH | Refills: 3 | Status: SHIPPED | OUTPATIENT
Start: 2024-02-13

## 2024-03-17 DIAGNOSIS — M85.80 OSTEOPENIA, UNSPECIFIED LOCATION: ICD-10-CM

## 2024-03-18 PROBLEM — M48.061 SPINAL STENOSIS OF LUMBAR REGION: Status: ACTIVE | Noted: 2024-03-18

## 2024-03-18 PROBLEM — M85.80 OSTEOPENIA, SENILE: Status: ACTIVE | Noted: 2018-05-10

## 2024-03-18 RX ORDER — ALENDRONATE SODIUM 70 MG/1
TABLET ORAL
Qty: 12 TABLET | Refills: 0 | Status: SHIPPED | OUTPATIENT
Start: 2024-03-18 | End: 2024-04-02

## 2024-03-19 ENCOUNTER — OFFICE VISIT (OUTPATIENT)
Dept: PRIMARY CARE | Facility: CLINIC | Age: 84
End: 2024-03-19
Payer: MEDICARE

## 2024-03-19 VITALS
WEIGHT: 124 LBS | TEMPERATURE: 97.8 F | SYSTOLIC BLOOD PRESSURE: 144 MMHG | HEIGHT: 62 IN | BODY MASS INDEX: 22.82 KG/M2 | HEART RATE: 71 BPM | OXYGEN SATURATION: 97 % | DIASTOLIC BLOOD PRESSURE: 84 MMHG

## 2024-03-19 DIAGNOSIS — N81.4 UTERINE PROLAPSE: Primary | ICD-10-CM

## 2024-03-19 DIAGNOSIS — R35.0 URINARY FREQUENCY: ICD-10-CM

## 2024-03-19 PROCEDURE — 3079F DIAST BP 80-89 MM HG: CPT | Performed by: NURSE PRACTITIONER

## 2024-03-19 PROCEDURE — 99213 OFFICE O/P EST LOW 20 MIN: CPT | Performed by: NURSE PRACTITIONER

## 2024-03-19 PROCEDURE — 3077F SYST BP >= 140 MM HG: CPT | Performed by: NURSE PRACTITIONER

## 2024-03-19 PROCEDURE — 1160F RVW MEDS BY RX/DR IN RCRD: CPT | Performed by: NURSE PRACTITIONER

## 2024-03-19 PROCEDURE — 1036F TOBACCO NON-USER: CPT | Performed by: NURSE PRACTITIONER

## 2024-03-19 PROCEDURE — 1159F MED LIST DOCD IN RCRD: CPT | Performed by: NURSE PRACTITIONER

## 2024-03-19 ASSESSMENT — ENCOUNTER SYMPTOMS: CONSTITUTIONAL NEGATIVE: 1

## 2024-03-19 NOTE — PROGRESS NOTES
"Subjective   Patient ID: Jackie Matthews is a 83 y.o. female who presents for Urinary Incontinence (With possible prolapsed bladder).    HPI Presents today with concerns for what she calls a bulge that she feels in her vaginal canal Does not hurt, not always down.  Will push it back up.  Has had for year.   Goes to bathroom  urinates every few hours  Use pantie liner but is finding she is not leaving her home as much because as it falls she has to urinate more frequently.  Is fine when laying down at night    Review of Systems   Constitutional: Negative.    Genitourinary:         As noted in HPI         Objective   /84 (BP Location: Left arm, Patient Position: Sitting)   Pulse 71   Temp 36.6 °C (97.8 °F) (Temporal)   Ht 1.575 m (5' 2\")   Wt 56.2 kg (124 lb)   SpO2 97%   BMI 22.68 kg/m²     Physical Exam  Constitutional:       Appearance: Normal appearance.   Musculoskeletal:         General: Normal range of motion.   Skin:     General: Skin is warm.   Neurological:      Mental Status: She is alert.   Psychiatric:         Mood and Affect: Mood normal.         Behavior: Behavior normal.         Thought Content: Thought content normal.         Judgment: Judgment normal.         Assessment/Plan   Problem List Items Addressed This Visit    None  Visit Diagnoses         Codes    Uterine prolapse    -  Primary N81.4    Relevant Orders    Referral to Urogynecology    Urinary frequency     R35.0               "

## 2024-03-25 ENCOUNTER — OFFICE VISIT (OUTPATIENT)
Dept: UROLOGY | Facility: CLINIC | Age: 84
End: 2024-03-25
Payer: MEDICARE

## 2024-03-25 VITALS — DIASTOLIC BLOOD PRESSURE: 82 MMHG | SYSTOLIC BLOOD PRESSURE: 148 MMHG | HEART RATE: 74 BPM

## 2024-03-25 DIAGNOSIS — N81.10 VAGINAL PROLAPSE: Primary | ICD-10-CM

## 2024-03-25 DIAGNOSIS — N81.4 UTERINE PROLAPSE: ICD-10-CM

## 2024-03-25 DIAGNOSIS — N32.81 OAB (OVERACTIVE BLADDER): ICD-10-CM

## 2024-03-25 DIAGNOSIS — N39.46 MIXED INCONTINENCE: ICD-10-CM

## 2024-03-25 LAB
POC BILIRUBIN, URINE: NEGATIVE
POC BLOOD, URINE: NEGATIVE
POC GLUCOSE, URINE: NEGATIVE MG/DL
POC KETONES, URINE: NEGATIVE MG/DL
POC LEUKOCYTES, URINE: NEGATIVE
POC NITRITE,URINE: NEGATIVE
POC PH, URINE: 7.5 PH
POC PROTEIN, URINE: NEGATIVE MG/DL
POC SPECIFIC GRAVITY, URINE: 1.01
POC UROBILINOGEN, URINE: 0.2 EU/DL

## 2024-03-25 PROCEDURE — 3079F DIAST BP 80-89 MM HG: CPT

## 2024-03-25 PROCEDURE — 81003 URINALYSIS AUTO W/O SCOPE: CPT

## 2024-03-25 PROCEDURE — 3077F SYST BP >= 140 MM HG: CPT

## 2024-03-25 PROCEDURE — 1036F TOBACCO NON-USER: CPT

## 2024-03-25 PROCEDURE — 1160F RVW MEDS BY RX/DR IN RCRD: CPT

## 2024-03-25 PROCEDURE — 1159F MED LIST DOCD IN RCRD: CPT

## 2024-03-25 PROCEDURE — 51798 US URINE CAPACITY MEASURE: CPT

## 2024-03-25 PROCEDURE — 99205 OFFICE O/P NEW HI 60 MIN: CPT

## 2024-03-25 PROCEDURE — 2000F BLOOD PRESSURE MEASURE: CPT

## 2024-03-25 RX ORDER — VIBEGRON 75 MG/1
75 TABLET, FILM COATED ORAL DAILY
Qty: 84 TABLET | Refills: 0 | COMMUNITY
Start: 2024-03-25 | End: 2024-06-04 | Stop reason: ALTCHOICE

## 2024-03-25 NOTE — PROGRESS NOTES
[facility]  Outpatient Clinic Note    Patient: Jackie Matthews  Age/Sex: 83 y.o., female  MRN: [id number]  Referred by: Juliette AJCKSON     Chief Complaint:  Vaginal Prolapse         History of Present Illness  This is a 83 y.o. female,  presents to the office as a new patient for prolapse. The patient stated she can feel this vaginal bulge, it is not always there but can push it back up, this started about a year ago. She admits to urinary urgency and frequency as well. She has UUI and SHARLENE, UUI is more bothersome to her. She gets up 2-3 times a night to go to the bathroom. She has not tried medication before. She reports having to lean/bend over to fully empty her bladder. She denies dysuria, gross hematuria, flank pain, pelvic pain, fever or chills.   She reports her bowel movements are normal and daily. She is not sexually active and does not plan to be in the future. She has had 3 vaginal births. She denies any other pelvic surgeries. Denies a history of breast cancer. History os JACLYN and uses CPAP at nighttime. Former cigarette smoker, she smoked for 61 years quitting 10 years ago smoking less than a half pack a day.        Gyn History:  - Menopausal: No           Postmenopausal bleeding: No  - Hysterectomy: No  - Pap up to date: No   History of abnormal pap: No  - Sexually active:  No- does not want to be sexually active in the future  Dyspareunia: No   Other issues:   - Number of prior vaginal deliveries: 3  - Number of prior c-sections: 0    - Mammogram up to date: Yes - 2022          Past Medical & Surgical History  Past Medical History:   Diagnosis Date    Personal history of (healed) traumatic fracture 2019    History of fracture of pelvis    Personal history of other diseases of the circulatory system     History of hypertension    Personal history of other diseases of the musculoskeletal system and connective tissue     History of osteoarthritis    Personal history of other  diseases of the respiratory system     History of chronic obstructive lung disease    Personal history of other diseases of the respiratory system     History of pulmonary emphysema    Personal history of other endocrine, nutritional and metabolic disease     History of high cholesterol    Personal history of other specified conditions     History of shortness of breath    Personal history of other specified conditions     History of snoring     Past Surgical History:   Procedure Laterality Date    CATARACT EXTRACTION  01/08/2017    Cataract Surgery    OTHER SURGICAL HISTORY  08/12/2021    Tonsillectomy    OTHER SURGICAL HISTORY  08/12/2021    Knee replacement    TOTAL KNEE ARTHROPLASTY  01/08/2017    Total Knee Arthroplasty       Family History  Family History   Problem Relation Name Age of Onset    Alzheimer's disease Mother      Other (Sepsis) Mother      Dementia Father      Dementia Sister      Sleep apnea Brother         Social History  She reports that she quit smoking about 16 years ago. Her smoking use included cigarettes. She has a 12.50 pack-year smoking history. She has been exposed to tobacco smoke. She has never used smokeless tobacco. She reports current alcohol use of about 3.0 standard drinks of alcohol per week. She reports that she does not use drugs.    Allergies  Losartan and Sulfa (sulfonamide antibiotics)    Medications:  Current Outpatient Medications on File Prior to Visit   Medication Sig Dispense Refill    albuterol 90 mcg/actuation inhaler inhale 2 puffs by mouth every 4 to 6 hours as needed 25.5 g 0    alendronate (Fosamax) 70 mg tablet TAKE ONE TABLET BY MOUTH ONCE A WEEK 12 tablet 0    amLODIPine (Norvasc) 10 mg tablet TAKE ONE TABLET BY MOUTH DAILY 90 tablet 0    ascorbic acid, vitamin C, 500 mg capsule Vitamin C 500 MG Oral Capsule   Refills: 0        Start : 10-Aug-2021   Active      cholecalciferol (Vitamin D-3) 25 MCG (1000 UT) capsule Vitamin D3 25 MCG (1000 UT) Oral Capsule    Refills: 0        Start : 10-Aug-2021   Active      fluticasone propion-salmeteroL (Advair Diskus) 250-50 mcg/dose diskus inhaler INHALE ONE PUFF BY MOUTH TWO TIMES A DAY 60 each 3    metoprolol succinate XL (Toprol-XL) 25 mg 24 hr tablet TAKE ONE-HALF TABLET BY MOUTH ONCE DAILY 45 tablet 0    omeprazole (PriLOSEC) 40 mg DR capsule Take 1 capsule (40 mg) by mouth once daily.      pravastatin (Pravachol) 20 mg tablet TAKE ONE TABLET BY MOUTH EVERY DAY 90 tablet 0    triamcinolone (Kenalog) 0.1 % cream Apply topically 2 times a day. Apply to affected area 1-2 times daily as needed. 30 g 5    [DISCONTINUED] umeclidinium (Incruse Ellipta) 62.5 mcg/actuation inhalation Inhale 1 puff (62.5 mcg) once daily.       No current facility-administered medications on file prior to visit.        Review of Systems   A comprehensive 10+ review of systems was negative except for: see hpi          Physical Exam                                                                                                                      General: Well developed, well nourished, alert and cooperative, appears in no acute distress  Head: Normocephalic, atraumatic  Neck: supple, trachea midline  Eyes: Non-injected conjunctiva, sclera clear, no proptosis  Cardiac: Extremities are warm and well perfused. No edema, cyanosis or pallor.   Lungs: Breathing is easy, non-labored. Speaking in clear and complete sentences. Normal diaphragmatic movement.  Abdomen: soft, non-distended, non-tender, no rebound or guarding, no hernia and no CVA tenderness   MSK: Ambulatory with steady gait, unassisted  Neuro: alert and oriented to person, place and time  Psych: Demonstrates good judgement and reason, without hallucinations, abnormal affect or abnormal behaviors.  Skin: no obvious lesions, no rashes    PVR (by Ultrasound): 120mL   Urine dip:   Recent Results (from the past 6 hour(s))   POCT UA Automated manually resulted    Collection Time: 03/25/24 11:22 AM    Result Value Ref Range    POC Glucose, Urine NEGATIVE NEGATIVE mg/dl    POC Bilirubin, Urine NEGATIVE NEGATIVE    POC Ketones, Urine NEGATIVE NEGATIVE mg/dl    POC Specific Gravity, Urine 1.015 1.005 - 1.035    POC Blood, Urine NEGATIVE NEGATIVE    POC PH, Urine 7.5 No Reference Range Established PH    POC Protein, Urine NEGATIVE NEGATIVE, 30 (1+) mg/dl    POC Urobilinogen, Urine 0.2 0.2, 1.0 EU/DL    Poc Nitrite, Urine NEGATIVE NEGATIVE    POC Leukocytes, Urine NEGATIVE NEGATIVE       Labs  N/A    Imaging  N/A      IMPRESSION AND PLAN:  Jackie Matthews is a 83 y.o.  presents to the office as a new patient for prolapse. The patient stated she can feel this vaginal bulge, it is not always there but can push it back up, this started about a year ago. She admits to urinary urgency and frequency as well. She has UUI and SHARLENE, UUI is more bothersome to her.    POP  -Interested in surgery, does not want pessary. Interested with Colpocleisis  -Patient has appointment with Dr. Lee on     OAB  -Start Gemtessa  -we discussed botox vs sacral neuromodulation: both have similar efficacy 80% patients reports >50% improvement, botox associated with 5% risk of incomplete emptying, increase in UTI and will require re-injection in 6-9 months; and as early as 3 months. SNM is a staged procedure, 2 weeks apart, consisting first of lead implantation then internalization of IPG if there is improvement. Interstim is associated with lead migration, explantation, infection and bleeding, though risks are all <5%. We also discussed PTNS which is associated with success rates comparable to medical therapy but without side-effects without significant major morbidity.      FERNY: Urge dominant   -discussed mechanism of UUI and SHARLENE, and treatment options for both including PFT, pessary, sling for SHARLENE and PFT, pharmacotherapy and third-line therapy for OAB  -UDS ordered for   -Education handouts given to patient on treatment  options    Bulkamid is associated with slightly less success rate of a sling about 60 to 70% of women having >90% improvement. However, there seems to be similar long-term success compared to sling with fewer side-effects. Main AE is urinary retention which resolves within 24 hours of using a 10-12 [place] catheter.  I discussed that if she still has some leakage after her procedure, she could perform another injection within 4 weeks and this procedure being performed in the office.     We discussed the sling procedure in depth with her there is a long-term success rate of 70-80% complete continence and up to 90% significant improvement up to 10 years after surgery, though the sling is meant to last a lifetime, there is a <5% risk of subsequent surgery, either revision or excision within 9 years. The major complications include bladder perforation with sling placement <1%, retention requiring sling lysis 1-3%, transient retention requiring 2-3 days of catheter drainage 33%, and mesh erosion 1-3%.     Appointment for UDS and appointment following with Dr. Lee on 5/7 to go over test results and treatment options    All questions and concerns were answered and addressed.  The patient expressed understanding and agrees with the plan.     Reviewed and approved by PRAVIN MATHEW on 3/25/24 at 11:49 AM.

## 2024-03-25 NOTE — LETTER
2024     MANUEL Kebede  [address]  [facility]  [facility] [id number]    Patient: Jackie Matthews   YOB: 1940   Date of Visit: 3/25/2024       Dear MANUEL Uribe:    Thank you for referring Jackie Matthews to me for evaluation. Below are my notes for this consultation.  If you have questions, please do not hesitate to call me. I look forward to following your patient along with you.       Sincerely,     Cathi Kauffman PA-C      CC: No Recipients  ______________________________________________________________________________________      [facility] [facility]  Outpatient Clinic Note    Patient: Jackie Matthews  Age/Sex: 83 y.o., female  MRN: [id number]  Referred by: Juliette JACKSON     Chief Complaint:  Vaginal Prolapse         History of Present Illness  This is a 83 y.o. female,  presents to the office as a new patient for prolapse. The patient stated she can feel this vaginal bulge, it is not always there but can push it back up, this started about a year ago. She admits to urinary urgency and frequency as well. She has UUI and SHARLENE, UUI is more bothersome to her. She gets up 2-3 times a night to go to the bathroom. She has not tried medication before. She reports having to lean/bend over to fully empty her bladder. She denies dysuria, gross hematuria, flank pain, pelvic pain, fever or chills.   She reports her bowel movements are normal and daily. She is not sexually active and does not plan to be in the future. She has had 3 vaginal births. She denies any other pelvic surgeries. Denies a history of breast cancer. History os JACLYN and uses CPAP at nighttime. Former cigarette smoker, she smoked for 61 years quitting 10 years ago smoking less than a half pack a day.        Gyn History:  - Menopausal: No           Postmenopausal bleeding: No  - Hysterectomy: No  - Pap up to date: No   History of abnormal pap: No  - Sexually active:  No- does  not want to be sexually active in the future  Dyspareunia: No   Other issues:   - Number of prior vaginal deliveries: 3  - Number of prior c-sections: 0    - Mammogram up to date: Yes - 05/2022          Past Medical & Surgical History  Past Medical History:   Diagnosis Date   • Personal history of (healed) traumatic fracture 05/06/2019    History of fracture of pelvis   • Personal history of other diseases of the circulatory system     History of hypertension   • Personal history of other diseases of the musculoskeletal system and connective tissue     History of osteoarthritis   • Personal history of other diseases of the respiratory system     History of chronic obstructive lung disease   • Personal history of other diseases of the respiratory system     History of pulmonary emphysema   • Personal history of other endocrine, nutritional and metabolic disease     History of high cholesterol   • Personal history of other specified conditions     History of shortness of breath   • Personal history of other specified conditions     History of snoring     Past Surgical History:   Procedure Laterality Date   • CATARACT EXTRACTION  01/08/2017    Cataract Surgery   • OTHER SURGICAL HISTORY  08/12/2021    Tonsillectomy   • OTHER SURGICAL HISTORY  08/12/2021    Knee replacement   • TOTAL KNEE ARTHROPLASTY  01/08/2017    Total Knee Arthroplasty       Family History  Family History   Problem Relation Name Age of Onset   • Alzheimer's disease Mother     • Other (Sepsis) Mother     • Dementia Father     • Dementia Sister     • Sleep apnea Brother         Social History  She reports that she quit smoking about 16 years ago. Her smoking use included cigarettes. She has a 12.50 pack-year smoking history. She has been exposed to tobacco smoke. She has never used smokeless tobacco. She reports current alcohol use of about 3.0 standard drinks of alcohol per week. She reports that she does not use drugs.    Allergies  Losartan and  Sulfa (sulfonamide antibiotics)    Medications:  Current Outpatient Medications on File Prior to Visit   Medication Sig Dispense Refill   • albuterol 90 mcg/actuation inhaler inhale 2 puffs by mouth every 4 to 6 hours as needed 25.5 g 0   • alendronate (Fosamax) 70 mg tablet TAKE ONE TABLET BY MOUTH ONCE A WEEK 12 tablet 0   • amLODIPine (Norvasc) 10 mg tablet TAKE ONE TABLET BY MOUTH DAILY 90 tablet 0   • ascorbic acid, vitamin C, 500 mg capsule Vitamin C 500 MG Oral Capsule   Refills: 0        Start : 10-Aug-2021   Active     • cholecalciferol (Vitamin D-3) 25 MCG (1000 UT) capsule Vitamin D3 25 MCG (1000 UT) Oral Capsule   Refills: 0        Start : 10-Aug-2021   Active     • fluticasone propion-salmeteroL (Advair Diskus) 250-50 mcg/dose diskus inhaler INHALE ONE PUFF BY MOUTH TWO TIMES A DAY 60 each 3   • metoprolol succinate XL (Toprol-XL) 25 mg 24 hr tablet TAKE ONE-HALF TABLET BY MOUTH ONCE DAILY 45 tablet 0   • omeprazole (PriLOSEC) 40 mg DR capsule Take 1 capsule (40 mg) by mouth once daily.     • pravastatin (Pravachol) 20 mg tablet TAKE ONE TABLET BY MOUTH EVERY DAY 90 tablet 0   • triamcinolone (Kenalog) 0.1 % cream Apply topically 2 times a day. Apply to affected area 1-2 times daily as needed. 30 g 5   • [DISCONTINUED] umeclidinium (Incruse Ellipta) 62.5 mcg/actuation inhalation Inhale 1 puff (62.5 mcg) once daily.       No current facility-administered medications on file prior to visit.        Review of Systems   A comprehensive 10+ review of systems was negative except for: see hpi          Physical Exam                                                                                                                      General: Well developed, well nourished, alert and cooperative, appears in no acute distress  Head: Normocephalic, atraumatic  Neck: supple, trachea midline  Eyes: Non-injected conjunctiva, sclera clear, no proptosis  Cardiac: Extremities are warm and well perfused. No edema, cyanosis  or pallor.   Lungs: Breathing is easy, non-labored. Speaking in clear and complete sentences. Normal diaphragmatic movement.  Abdomen: soft, non-distended, non-tender, no rebound or guarding, no hernia and no CVA tenderness   MSK: Ambulatory with steady gait, unassisted  Neuro: alert and oriented to person, place and time  Psych: Demonstrates good judgement and reason, without hallucinations, abnormal affect or abnormal behaviors.  Skin: no obvious lesions, no rashes    PVR (by Ultrasound): 120mL   Urine dip:   Recent Results (from the past 6 hour(s))   POCT UA Automated manually resulted    Collection Time: 24 11:22 AM   Result Value Ref Range    POC Glucose, Urine NEGATIVE NEGATIVE mg/dl    POC Bilirubin, Urine NEGATIVE NEGATIVE    POC Ketones, Urine NEGATIVE NEGATIVE mg/dl    POC Specific Gravity, Urine 1.015 1.005 - 1.035    POC Blood, Urine NEGATIVE NEGATIVE    POC PH, Urine 7.5 No Reference Range Established PH    POC Protein, Urine NEGATIVE NEGATIVE, 30 (1+) mg/dl    POC Urobilinogen, Urine 0.2 0.2, 1.0 EU/DL    Poc Nitrite, Urine NEGATIVE NEGATIVE    POC Leukocytes, Urine NEGATIVE NEGATIVE       Labs  N/A    Imaging  N/A      IMPRESSION AND PLAN:  Jackie Matthews is a 83 y.o.  presents to the office as a new patient for prolapse. The patient stated she can feel this vaginal bulge, it is not always there but can push it back up, this started about a year ago. She admits to urinary urgency and frequency as well. She has UUI and SHARLENE, UUI is more bothersome to her.    POP  -Interested in surgery, does not want pessary. Interested with Colpocleisis  -Patient has appointment with Dr. Lee on     OAB  -Start Gemtessa  -we discussed botox vs sacral neuromodulation: both have similar efficacy 80% patients reports >50% improvement, botox associated with 5% risk of incomplete emptying, increase in UTI and will require re-injection in 6-9 months; and as early as 3 months. SNM is a staged procedure, 2  weeks apart, consisting first of lead implantation then internalization of IPG if there is improvement. Interstim is associated with lead migration, explantation, infection and bleeding, though risks are all <5%. We also discussed PTNS which is associated with success rates comparable to medical therapy but without side-effects without significant major morbidity.      FERNY: Urge dominant   -discussed mechanism of UUI and SHARLENE, and treatment options for both including PFT, pessary, sling for SHARLENE and PFT, pharmacotherapy and third-line therapy for OAB  -UDS ordered for 5/7  -Education handouts given to patient on treatment options    Bulkamid is associated with slightly less success rate of a sling about 60 to 70% of women having >90% improvement. However, there seems to be similar long-term success compared to sling with fewer side-effects. Main AE is urinary retention which resolves within 24 hours of using a 10-12 [place] catheter.  I discussed that if she still has some leakage after her procedure, she could perform another injection within 4 weeks and this procedure being performed in the office.     We discussed the sling procedure in depth with her there is a long-term success rate of 70-80% complete continence and up to 90% significant improvement up to 10 years after surgery, though the sling is meant to last a lifetime, there is a <5% risk of subsequent surgery, either revision or excision within 9 years. The major complications include bladder perforation with sling placement <1%, retention requiring sling lysis 1-3%, transient retention requiring 2-3 days of catheter drainage 33%, and mesh erosion 1-3%.     Appointment for UDS and appointment following with Dr. Lee on 5/7 to go over test results and treatment options    All questions and concerns were answered and addressed.  The patient expressed understanding and agrees with the plan.     Reviewed and approved by PRAVIN MATHEW on 3/25/24 at 11:49  AM.

## 2024-04-01 DIAGNOSIS — I10 PRIMARY HYPERTENSION: ICD-10-CM

## 2024-04-01 DIAGNOSIS — M85.80 OSTEOPENIA, UNSPECIFIED LOCATION: ICD-10-CM

## 2024-04-02 RX ORDER — AMLODIPINE BESYLATE 10 MG/1
10 TABLET ORAL DAILY
Qty: 90 TABLET | Refills: 0 | Status: SHIPPED | OUTPATIENT
Start: 2024-04-02

## 2024-04-02 RX ORDER — ALENDRONATE SODIUM 70 MG/1
TABLET ORAL
Qty: 12 TABLET | Refills: 0 | Status: SHIPPED | OUTPATIENT
Start: 2024-04-02

## 2024-05-07 ENCOUNTER — PROCEDURE VISIT (OUTPATIENT)
Dept: UROLOGY | Facility: CLINIC | Age: 84
End: 2024-05-07
Payer: MEDICARE

## 2024-05-07 ENCOUNTER — OFFICE VISIT (OUTPATIENT)
Dept: UROLOGY | Facility: CLINIC | Age: 84
End: 2024-05-07
Payer: MEDICARE

## 2024-05-07 DIAGNOSIS — N81.10 VAGINAL PROLAPSE: ICD-10-CM

## 2024-05-07 DIAGNOSIS — N81.9 FEMALE GENITAL PROLAPSE, UNSPECIFIED TYPE: ICD-10-CM

## 2024-05-07 PROCEDURE — 51784 ANAL/URINARY MUSCLE STUDY: CPT | Performed by: STUDENT IN AN ORGANIZED HEALTH CARE EDUCATION/TRAINING PROGRAM

## 2024-05-07 PROCEDURE — 51729 CYSTOMETROGRAM W/VP&UP: CPT | Performed by: STUDENT IN AN ORGANIZED HEALTH CARE EDUCATION/TRAINING PROGRAM

## 2024-05-07 PROCEDURE — 99205 OFFICE O/P NEW HI 60 MIN: CPT | Performed by: STUDENT IN AN ORGANIZED HEALTH CARE EDUCATION/TRAINING PROGRAM

## 2024-05-07 PROCEDURE — 1160F RVW MEDS BY RX/DR IN RCRD: CPT | Performed by: STUDENT IN AN ORGANIZED HEALTH CARE EDUCATION/TRAINING PROGRAM

## 2024-05-07 PROCEDURE — 51797 INTRAABDOMINAL PRESSURE TEST: CPT | Performed by: STUDENT IN AN ORGANIZED HEALTH CARE EDUCATION/TRAINING PROGRAM

## 2024-05-07 PROCEDURE — 1159F MED LIST DOCD IN RCRD: CPT | Performed by: STUDENT IN AN ORGANIZED HEALTH CARE EDUCATION/TRAINING PROGRAM

## 2024-05-07 PROCEDURE — 51741 ELECTRO-UROFLOWMETRY FIRST: CPT | Performed by: STUDENT IN AN ORGANIZED HEALTH CARE EDUCATION/TRAINING PROGRAM

## 2024-05-07 NOTE — PROGRESS NOTES
"HISTORY OF PRESENT ILLNESS:  Jackie Matthews, \"Amelie" is a 83 y.o. female who presents today for a follow up visit to discuss her UDS results. She states she feels and sees her prolapse and is bothered by it.  The bother is predominantly due to concern that the prolapse will suddenly get worse so that she will have an accident either with her bowel or her bladder when she is out of the house.  She has not had much leakage lately because she is able to make it to the restroom when at home. She does have increased frequency and urgency. She has never had any pelvic surgery. She has had 3 children in the past, all vaginally. She has COPD as she is a prior smoker. She is not sexually active.  She had UDS performed which demonstrated obstructive voiding related to the prolapse and no evidence underactive detrusor.  There is no evidence of stress incontinence.           Past Medical History  She has a past medical history of Personal history of (healed) traumatic fracture (05/06/2019), Personal history of other diseases of the circulatory system, Personal history of other diseases of the musculoskeletal system and connective tissue, Personal history of other diseases of the respiratory system, Personal history of other diseases of the respiratory system, Personal history of other endocrine, nutritional and metabolic disease, Personal history of other specified conditions, and Personal history of other specified conditions.    Surgical History  She has a past surgical history that includes Total knee arthroplasty (01/08/2017); Cataract extraction (01/08/2017); Other surgical history (08/12/2021); and Other surgical history (08/12/2021).     Social History  She reports that she quit smoking about 16 years ago. Her smoking use included cigarettes. She started smoking about 66 years ago. She has a 12.5 pack-year smoking history. She has been exposed to tobacco smoke. She has never used smokeless tobacco. She reports current " "alcohol use of about 3.0 standard drinks of alcohol per week. She reports that she does not use drugs.    Family History  Family History   Problem Relation Name Age of Onset    Alzheimer's disease Mother      Other (Sepsis) Mother      Dementia Father      Dementia Sister      Sleep apnea Brother          Allergies  Losartan and Sulfa (sulfonamide antibiotics)        A comprehensive 10+ review of systems was negative except for: see hpi                    PHYSICAL EXAMINATION:  BP Readings from Last 3 Encounters:   03/25/24 148/82   03/19/24 144/84   12/18/23 120/74      Wt Readings from Last 3 Encounters:   03/19/24 56.2 kg (124 lb)   12/18/23 54.6 kg (120 lb 6.4 oz)   10/30/23 53.5 kg (118 lb)      BMI: Estimated body mass index is 22.68 kg/m² as calculated from the following:    Height as of 3/19/24: 1.575 m (5' 2\").    Weight as of 3/19/24: 56.2 kg (124 lb).  BSA: Estimated body surface area is 1.57 meters squared as calculated from the following:    Height as of 3/19/24: 1.575 m (5' 2\").    Weight as of 3/19/24: 56.2 kg (124 lb).  HEENT: Normocephalic, atraumatic, PER EOMI, nonicteric, trachea normal, thyroid normal, oropharynx normal.  CARDIAC: regular rate & rhythm, S1 & S2 normal.  No heaves, thrills, gallops or murmurs.  LUNGS: Clear to auscultation, no spinal or CV tenderness.  EXTREMITIES: No evidence of cyanosis, clubbing or edema.      Pelvic:  Genitourinary:  normal external genitalia, Bartholin's glands negative, Stamps's glands negative  Urethra   normal meatus, non-tender, no periurethral mass  Vaginal mucosa  normal  Cervix  normal  Uterus  normal size, nontender  Adnexae  negative nontender, no masses  Atrophy negative    CST positive  Pelvic floor muscle contraction  3/5    POP-Q (in supine position):        Aa 0     Ba 0     C 2              gh 3     pb 3     tvl 8              Ap 0     Bp 0     D 0    Rectal: no hemorrhoids, fissures or masses    IMPRESSION AND PLAN:     "           Assessment:  83 y.o.  presents with POP, FERNY     uterovaginal prolapse:   I discussed treatment options including pessary and surgery, with regard to surgery discussed hysterectomy vs. Hysteropexy: major benefit of hysteropexy is shorter OR time and less EBL and outcomes equivalent to hysterectomy + repair at 3 years, but no data beyond that time point. Also discussed SCP vs native tissue repair; for SCP the failure rate is 5-10%, but associated with mesh complications including erosion <1% and SBO <0.5% vs native tissue repair which is associated with 20-30% failure rate, but no long term risk of complications and only~15% requiring additional treatment.  Discussed colpocleisis and levator myorrhaphy which has a >95% success rate but is associated with risk of regret 2/2 loss of sexual function related to penetrative intercourse and 50% risk of occult SHARLENE.      Patient is no longer sexually active and wishes to proceed with the obliterated procedure    I did discuss with her that it is unlikely that her prolapse will get suddenly worse it is typically gradual worsening, and that if she is not presently bothered by urinary or bowel complaints it is unlikely that this will be an issue for her in the next few months.        FERNY/OAB: Urge dominant   UDS demonstrates no evidence of stress incontinence, she will not need a sling    All questions and concerns were answered and addressed.  The patient expressed understanding and agrees with the plan.     Fernando Lee MD    Scribe Attestation  By signing my name below, IKarly Scribe   attest that this documentation has been prepared under the direction and in the presence of Fernando Lee MD.

## 2024-05-07 NOTE — PROGRESS NOTES
Urodynamic Study:  Jackie Matthews identified using 2 forms of identification. A timeout was completed, patient is in the correct position and all safety precautions have been taken.   Risks, Benefits and Alternatives:  Risks, benefits and alternatives were discussed in detail. The patient appears to understand and agrees to proceed. Jackie Matthews has completed, signed and dated the procedure consent form.    Uroflow completed.  Using sterile technique, a 7fr Air Charge Catheter was inserted into the bladder. Rectal catheter inserted approximately 15cm  Bladder filled with normal saline at a rate of 49.9ml/min 511.5 ml of normal saline instilled in bladder prior to voiding.   Results:  Post void residual  (PVR)  volume of 200  ml.      Patient here for urodynamic study. Discussed procedure. Patient has complaints of having a prolapse. She does feel a bulge. She has urgency and frequency and feels that sometimes she does not empty her bladder well. Her urgency does cause some incontinence. Patient also complains of stress incontinence. She notes that the Gemtesa did not help her at all and she thinks she had a reaction to the medication causing her to have allergy like symptoms and a rash on her face. After she stopped taking it her symptoms improved in 1 week. Performed UDS without difficulty. She did not leak with cough stress test today. Instructed patient to increase fluid intake.

## 2024-05-07 NOTE — LETTER
"May 8, 2024     Cathi Kauffman PA-C  [id number] [address] [facility], [address] [address]    Patient: Jackie Matthews   YOB: 1940   Date of Visit: 5/7/2024       Dear Dr. Cathi Kauffman PA-C:    Thank you for referring Jackie Matthews to me for evaluation. Below are my notes for this consultation.  If you have questions, please do not hesitate to call me. I look forward to following your patient along with you.       Sincerely,     Fernando Lee MD      CC: No Recipients  ______________________________________________________________________________________    HISTORY OF PRESENT ILLNESS:  Jackie Matthews, \"Vikki\" is a 83 y.o. female who presents today for a follow up visit to discuss her UDS results. She states she feels and sees her prolapse and is bothered by it.  The bother is predominantly due to concern that the prolapse will suddenly get worse so that she will have an accident either with her bowel or her bladder when she is out of the house.  She has not had much leakage lately because she is able to make it to the restroom when at home. She does have increased frequency and urgency. She has never had any pelvic surgery. She has had 3 children in the past, all vaginally. She has COPD as she is a prior smoker. She is not sexually active.  She had UDS performed which demonstrated obstructive voiding related to the prolapse and no evidence underactive detrusor.  There is no evidence of stress incontinence.           Past Medical History  She has a past medical history of Personal history of (healed) traumatic fracture (05/06/2019), Personal history of other diseases of the circulatory system, Personal history of other diseases of the musculoskeletal system and connective tissue, Personal history of other diseases of the respiratory system, Personal history of other diseases of the respiratory system, Personal history of other endocrine, nutritional and metabolic disease, Personal " "history of other specified conditions, and Personal history of other specified conditions.    Surgical History  She has a past surgical history that includes Total knee arthroplasty (01/08/2017); Cataract extraction (01/08/2017); Other surgical history (08/12/2021); and Other surgical history (08/12/2021).     Social History  She reports that she quit smoking about 16 years ago. Her smoking use included cigarettes. She started smoking about 66 years ago. She has a 12.5 pack-year smoking history. She has been exposed to tobacco smoke. She has never used smokeless tobacco. She reports current alcohol use of about 3.0 standard drinks of alcohol per week. She reports that she does not use drugs.    Family History  Family History   Problem Relation Name Age of Onset   • Alzheimer's disease Mother     • Other (Sepsis) Mother     • Dementia Father     • Dementia Sister     • Sleep apnea Brother          Allergies  Losartan and Sulfa (sulfonamide antibiotics)        A comprehensive 10+ review of systems was negative except for: see hpi                    PHYSICAL EXAMINATION:  BP Readings from Last 3 Encounters:   03/25/24 148/82   03/19/24 144/84   12/18/23 120/74      Wt Readings from Last 3 Encounters:   03/19/24 56.2 kg (124 lb)   12/18/23 54.6 kg (120 lb 6.4 oz)   10/30/23 53.5 kg (118 lb)      BMI: Estimated body mass index is 22.68 kg/m² as calculated from the following:    Height as of 3/19/24: 1.575 m (5' 2\").    Weight as of 3/19/24: 56.2 kg (124 lb).  BSA: Estimated body surface area is 1.57 meters squared as calculated from the following:    Height as of 3/19/24: 1.575 m (5' 2\").    Weight as of 3/19/24: 56.2 kg (124 lb).  HEENT: Normocephalic, atraumatic, PER EOMI, nonicteric, trachea normal, thyroid normal, oropharynx normal.  CARDIAC: regular rate & rhythm, S1 & S2 normal.  No heaves, thrills, gallops or murmurs.  LUNGS: Clear to auscultation, no spinal or CV tenderness.  EXTREMITIES: No evidence of cyanosis, " clubbing or edema.      Pelvic:  Genitourinary:  normal external genitalia, Bartholin's glands negative, [place]'s glands negative  Urethra   normal meatus, non-tender, no periurethral mass  Vaginal mucosa  normal  Cervix  normal  Uterus  normal size, nontender  Adnexae  negative nontender, no masses  Atrophy negative    CST positive  Pelvic floor muscle contraction  3/5    POP-Q (in supine position):        Aa 0     Ba 0     C 2              gh 3     pb 3     tvl 8              Ap 0     Bp 0     D 0    Rectal: no hemorrhoids, fissures or masses    IMPRESSION AND PLAN:               Assessment:  83 y.o.  presents with POP, FERNY     uterovaginal prolapse:   I discussed treatment options including pessary and surgery, with regard to surgery discussed hysterectomy vs. Hysteropexy: major benefit of hysteropexy is shorter OR time and less EBL and outcomes equivalent to hysterectomy + repair at 3 years, but no data beyond that time point. Also discussed SCP vs native tissue repair; for SCP the failure rate is 5-10%, but associated with mesh complications including erosion <1% and SBO <0.5% vs native tissue repair which is associated with 20-30% failure rate, but no long term risk of complications and only~15% requiring additional treatment.  Discussed colpocleisis and levator myorrhaphy which has a >95% success rate but is associated with risk of regret 2/2 loss of sexual function related to penetrative intercourse and 50% risk of occult SHARLENE.      Patient is no longer sexually active and wishes to proceed with the obliterated procedure    I did discuss with her that it is unlikely that her prolapse will get suddenly worse it is typically gradual worsening, and that if she is not presently bothered by urinary or bowel complaints it is unlikely that this will be an issue for her in the next few months.        FERNY/OAB: Urge dominant   UDS demonstrates no evidence of stress incontinence, she will not need a sling    All  questions and concerns were answered and addressed.  The patient expressed understanding and agrees with the plan.     Fernando Lee MD    Scribe Attestation  By signing my name below, I, Karly Sanderson Scribrobin   attest that this documentation has been prepared under the direction and in the presence of Fernando Lee MD.

## 2024-05-08 RX ORDER — ACETAMINOPHEN 325 MG/1
650 TABLET ORAL ONCE
OUTPATIENT
Start: 2024-05-08 | End: 2024-05-08

## 2024-05-08 RX ORDER — CELECOXIB 50 MG/1
200 CAPSULE ORAL ONCE
OUTPATIENT
Start: 2024-05-08 | End: 2024-05-08

## 2024-05-08 RX ORDER — PHENAZOPYRIDINE HYDROCHLORIDE 200 MG/1
200 TABLET, FILM COATED ORAL ONCE
OUTPATIENT
Start: 2024-05-08 | End: 2024-05-08

## 2024-05-08 RX ORDER — CEFAZOLIN SODIUM 2 G/100ML
2 INJECTION, SOLUTION INTRAVENOUS ONCE
OUTPATIENT
Start: 2024-05-08 | End: 2024-05-08

## 2024-05-09 PROBLEM — N81.9 FEMALE GENITAL PROLAPSE: Status: ACTIVE | Noted: 2024-05-07

## 2024-05-13 DIAGNOSIS — I10 PRIMARY HYPERTENSION: ICD-10-CM

## 2024-05-13 DIAGNOSIS — E78.5 HYPERLIPIDEMIA, UNSPECIFIED HYPERLIPIDEMIA TYPE: ICD-10-CM

## 2024-05-14 RX ORDER — METOPROLOL SUCCINATE 25 MG/1
12.5 TABLET, EXTENDED RELEASE ORAL DAILY
Qty: 45 TABLET | Refills: 0 | Status: SHIPPED | OUTPATIENT
Start: 2024-05-14 | End: 2024-05-29

## 2024-05-14 RX ORDER — PRAVASTATIN SODIUM 20 MG/1
TABLET ORAL
Qty: 90 TABLET | Refills: 0 | Status: SHIPPED | OUTPATIENT
Start: 2024-05-14 | End: 2024-05-29

## 2024-05-28 ENCOUNTER — APPOINTMENT (OUTPATIENT)
Dept: UROLOGY | Facility: CLINIC | Age: 84
End: 2024-05-28
Payer: MEDICARE

## 2024-05-29 DIAGNOSIS — E78.5 HYPERLIPIDEMIA, UNSPECIFIED HYPERLIPIDEMIA TYPE: ICD-10-CM

## 2024-05-29 DIAGNOSIS — I10 PRIMARY HYPERTENSION: ICD-10-CM

## 2024-05-29 RX ORDER — METOPROLOL SUCCINATE 25 MG/1
12.5 TABLET, EXTENDED RELEASE ORAL DAILY
Qty: 45 TABLET | Refills: 0 | Status: SHIPPED | OUTPATIENT
Start: 2024-05-29

## 2024-05-29 RX ORDER — PRAVASTATIN SODIUM 20 MG/1
TABLET ORAL
Qty: 90 TABLET | Refills: 0 | Status: SHIPPED | OUTPATIENT
Start: 2024-05-29

## 2024-06-04 ENCOUNTER — OFFICE VISIT (OUTPATIENT)
Dept: UROLOGY | Facility: CLINIC | Age: 84
End: 2024-06-04
Payer: MEDICARE

## 2024-06-04 ENCOUNTER — PRE-ADMISSION TESTING (OUTPATIENT)
Dept: PREADMISSION TESTING | Facility: HOSPITAL | Age: 84
End: 2024-06-04
Payer: MEDICARE

## 2024-06-04 VITALS
TEMPERATURE: 97.3 F | BODY MASS INDEX: 22.27 KG/M2 | DIASTOLIC BLOOD PRESSURE: 76 MMHG | HEART RATE: 68 BPM | OXYGEN SATURATION: 96 % | RESPIRATION RATE: 18 BRPM | HEIGHT: 63 IN | SYSTOLIC BLOOD PRESSURE: 155 MMHG | WEIGHT: 125.66 LBS

## 2024-06-04 DIAGNOSIS — N81.9 FEMALE GENITAL PROLAPSE, UNSPECIFIED TYPE: ICD-10-CM

## 2024-06-04 DIAGNOSIS — Z01.818 PRE-OPERATIVE EXAMINATION: Primary | ICD-10-CM

## 2024-06-04 DIAGNOSIS — N39.3 SUI (STRESS URINARY INCONTINENCE, FEMALE): Primary | ICD-10-CM

## 2024-06-04 LAB
ANION GAP SERPL CALC-SCNC: 15 MMOL/L (ref 10–20)
BASOPHILS # BLD AUTO: 0.03 X10*3/UL (ref 0–0.1)
BASOPHILS NFR BLD AUTO: 0.4 %
BUN SERPL-MCNC: 22 MG/DL (ref 6–23)
CALCIUM SERPL-MCNC: 9.9 MG/DL (ref 8.6–10.3)
CHLORIDE SERPL-SCNC: 96 MMOL/L (ref 98–107)
CO2 SERPL-SCNC: 29 MMOL/L (ref 21–32)
CREAT SERPL-MCNC: 0.86 MG/DL (ref 0.5–1.05)
EGFRCR SERPLBLD CKD-EPI 2021: 67 ML/MIN/1.73M*2
EOSINOPHIL # BLD AUTO: 0.13 X10*3/UL (ref 0–0.4)
EOSINOPHIL NFR BLD AUTO: 1.5 %
ERYTHROCYTE [DISTWIDTH] IN BLOOD BY AUTOMATED COUNT: 12.7 % (ref 11.5–14.5)
GLUCOSE SERPL-MCNC: 97 MG/DL (ref 74–99)
HCT VFR BLD AUTO: 38.1 % (ref 36–46)
HGB BLD-MCNC: 12.6 G/DL (ref 12–16)
IMM GRANULOCYTES # BLD AUTO: 0.02 X10*3/UL (ref 0–0.5)
IMM GRANULOCYTES NFR BLD AUTO: 0.2 % (ref 0–0.9)
LYMPHOCYTES # BLD AUTO: 1.39 X10*3/UL (ref 0.8–3)
LYMPHOCYTES NFR BLD AUTO: 16.3 %
MCH RBC QN AUTO: 30 PG (ref 26–34)
MCHC RBC AUTO-ENTMCNC: 33.1 G/DL (ref 32–36)
MCV RBC AUTO: 91 FL (ref 80–100)
MONOCYTES # BLD AUTO: 0.82 X10*3/UL (ref 0.05–0.8)
MONOCYTES NFR BLD AUTO: 9.6 %
NEUTROPHILS # BLD AUTO: 6.13 X10*3/UL (ref 1.6–5.5)
NEUTROPHILS NFR BLD AUTO: 72 %
NRBC BLD-RTO: 0 /100 WBCS (ref 0–0)
PLATELET # BLD AUTO: 333 X10*3/UL (ref 150–450)
POTASSIUM SERPL-SCNC: 4.6 MMOL/L (ref 3.5–5.3)
RBC # BLD AUTO: 4.2 X10*6/UL (ref 4–5.2)
SODIUM SERPL-SCNC: 135 MMOL/L (ref 136–145)
WBC # BLD AUTO: 8.5 X10*3/UL (ref 4.4–11.3)

## 2024-06-04 PROCEDURE — 85025 COMPLETE CBC W/AUTO DIFF WBC: CPT

## 2024-06-04 PROCEDURE — 80048 BASIC METABOLIC PNL TOTAL CA: CPT

## 2024-06-04 PROCEDURE — 36415 COLL VENOUS BLD VENIPUNCTURE: CPT

## 2024-06-04 PROCEDURE — 93005 ELECTROCARDIOGRAM TRACING: CPT

## 2024-06-04 PROCEDURE — 93010 ELECTROCARDIOGRAM REPORT: CPT | Performed by: INTERNAL MEDICINE

## 2024-06-04 PROCEDURE — 99214 OFFICE O/P EST MOD 30 MIN: CPT | Performed by: STUDENT IN AN ORGANIZED HEALTH CARE EDUCATION/TRAINING PROGRAM

## 2024-06-04 RX ORDER — IBUPROFEN 200 MG
TABLET ORAL EVERY 6 HOURS PRN
COMMUNITY

## 2024-06-04 RX ORDER — TAMSULOSIN HYDROCHLORIDE 0.4 MG/1
CAPSULE ORAL
Qty: 10 CAPSULE | Refills: 0 | Status: SHIPPED | OUTPATIENT
Start: 2024-06-04

## 2024-06-04 ASSESSMENT — LIFESTYLE VARIABLES: SMOKING_STATUS: NONSMOKER

## 2024-06-04 ASSESSMENT — DUKE ACTIVITY SCORE INDEX (DASI)
CAN YOU TAKE CARE OF YOURSELF (EAT, DRESS, BATHE, OR USE TOILET): YES
CAN YOU DO YARD WORK LIKE RAKING LEAVES, WEEDING OR PUSHING A MOWER: YES
DASI METS SCORE: 6.6
CAN YOU WALK INDOORS, SUCH AS AROUND YOUR HOUSE: YES
CAN YOU PARTICIPATE IN STRENOUS SPORTS LIKE SWIMMING, SINGLES TENNIS, FOOTBALL, BASKETBALL, OR SKIING: NO
CAN YOU WALK A BLOCK OR TWO ON LEVEL GROUND: YES
CAN YOU DO MODERATE WORK AROUND THE HOUSE LIKE VACUUMING, SWEEPING FLOORS OR CARRYING GROCERIES: YES
CAN YOU DO HEAVY WORK AROUND THE HOUSE LIKE SCRUBBING FLOORS OR LIFTING AND MOVING HEAVY FURNITURE: YES
TOTAL_SCORE: 31.45
CAN YOU RUN A SHORT DISTANCE: NO
CAN YOU PARTICIPATE IN MODERATE RECREATIONAL ACTIVITIES LIKE GOLF, BOWLING, DANCING, DOUBLES TENNIS OR THROWING A BASEBALL OR FOOTBALL: NO
CAN YOU CLIMB A FLIGHT OF STAIRS OR WALK UP A HILL: YES
CAN YOU DO LIGHT WORK AROUND THE HOUSE LIKE DUSTING OR WASHING DISHES: YES
CAN YOU HAVE SEXUAL RELATIONS: NO

## 2024-06-04 ASSESSMENT — ENCOUNTER SYMPTOMS
CARDIOVASCULAR NEGATIVE: 1
GASTROINTESTINAL NEGATIVE: 1
NEUROLOGICAL NEGATIVE: 1
CONSTITUTIONAL NEGATIVE: 1
NECK NEGATIVE: 1
EYES NEGATIVE: 1
DYSURIA: 1
RESPIRATORY NEGATIVE: 1
MUSCULOSKELETAL NEGATIVE: 1

## 2024-06-04 ASSESSMENT — ACTIVITIES OF DAILY LIVING (ADL): ADL_SCORE: 0

## 2024-06-04 NOTE — H&P (VIEW-ONLY)
CPM/PAT Evaluation       Name: Jackie Matthews (Jackie MARCELLE Matthews)  /Age: 1940/83 y.o.     In-Person       Chief Complaint: Evaluation prior to surgery    HPI  83 year old female scheduled for COLPOCLEISIS  POSTERIOR REPAIR on 24 with Dr. Lee secondary to Female genital prolapse. PMHx includes HTN, HLD, COPD, JACLYN. Presents to CPM today for preoperative risk stratification and optimization.   Past Medical History:   Diagnosis Date    Personal history of (healed) traumatic fracture 2019    History of fracture of pelvis    Personal history of other diseases of the circulatory system     History of hypertension    Personal history of other diseases of the musculoskeletal system and connective tissue     History of osteoarthritis    Personal history of other diseases of the respiratory system     History of chronic obstructive lung disease    Personal history of other diseases of the respiratory system     History of pulmonary emphysema    Personal history of other endocrine, nutritional and metabolic disease     History of high cholesterol    Personal history of other specified conditions     History of shortness of breath    Personal history of other specified conditions     History of snoring       Past Surgical History:   Procedure Laterality Date    CATARACT EXTRACTION  2017    Cataract Surgery    OTHER SURGICAL HISTORY  2021    Tonsillectomy    OTHER SURGICAL HISTORY  2021    Knee replacement    TOTAL KNEE ARTHROPLASTY  2017    Total Knee Arthroplasty       Patient  has no history on file for sexual activity.    Family History   Problem Relation Name Age of Onset    Alzheimer's disease Mother      Other (Sepsis) Mother      Dementia Father      Dementia Sister      Sleep apnea Brother         Allergies   Allergen Reactions    Losartan Diarrhea    Sulfa (Sulfonamide Antibiotics) Rash       Prior to Admission medications    Medication Sig Start Date End Date Taking?  Authorizing Provider   albuterol 90 mcg/actuation inhaler inhale 2 puffs by mouth every 4 to 6 hours as needed 9/11/23   Ana Rosa Brunner MD   alendronate (Fosamax) 70 mg tablet TAKE ONE TABLET BY MOUTH ONCE A WEEK 4/2/24   Ana Rosa Brunner MD   amLODIPine (Norvasc) 10 mg tablet TAKE ONE TABLET BY MOUTH EVERY DAY 4/2/24   Ana Rosa Brunner MD   ascorbic acid, vitamin C, 500 mg capsule Vitamin C 500 MG Oral Capsule   Refills: 0        Start : 10-Aug-2021   Active 8/10/21   Historical Provider, MD   cholecalciferol (Vitamin D-3) 25 MCG (1000 UT) capsule Vitamin D3 25 MCG (1000 UT) Oral Capsule   Refills: 0        Start : 10-Aug-2021   Active 8/10/21   Historical Provider, MD   fluticasone propion-salmeteroL (Advair Diskus) 250-50 mcg/dose diskus inhaler INHALE ONE PUFF BY MOUTH TWO TIMES A DAY 2/13/24   Ana Rosa Brunner MD   metoprolol succinate XL (Toprol-XL) 25 mg 24 hr tablet TAKE ONE-HALF TABLET BY MOUTH ONCE A DAY 5/29/24   Ana Rosa Brunner MD   omeprazole (PriLOSEC) 40 mg DR capsule Take 1 capsule (40 mg) by mouth once daily. 2/3/22   Historical Provider, MD   pravastatin (Pravachol) 20 mg tablet TAKE ONE TABLET BY MOUTH EVERY DAY 5/29/24   Ana Rosa Brunner MD   tamsulosin (Flomax) 0.4 mg 24 hr capsule Take 3 days before surgery and 7 days after 6/4/24   Fernando Lee MD   triamcinolone (Kenalog) 0.1 % cream Apply topically 2 times a day. Apply to affected area 1-2 times daily as needed. 7/26/23   Ana Rosa Brunner MD   vibegron (Gemtesa) 75 mg tablet Take 1 tablet (75 mg) by mouth early in the morning.. LOT [id number]  EXP June 2026 3/25/24 6/17/24  Cathi Kauffman PA-C   metoprolol succinate XL (Toprol-XL) 25 mg 24 hr tablet TAKE ONE-HALF TABLET BY MOUTH ONCE A DAY 5/14/24 5/29/24  Ana Rosa Brunner MD   pravastatin (Pravachol) 20 mg tablet TAKE ONE TABLET BY MOUTH EVERY DAY 5/14/24 5/29/24  Ana Rosa Brunner MD        [facility] ROS:   Constitutional:   neg    Neuro/Psych:   neg    Eyes:   neg    Ears:   neg    Nose:   Mouth:   neg     Throat:   Neck:   neg    Cardio:   neg    Respiratory:   neg    Endocrine:   GI:   neg    :    dysuria   polyuria  Musculoskeletal:   neg    Hematologic:   neg    Skin:  neg        Physical Exam  Vitals reviewed.   Constitutional:       Appearance: Normal appearance.   HENT:      Head: Normocephalic and atraumatic.      Nose: Nose normal.      Mouth/Throat:      Mouth: Mucous membranes are moist.      Pharynx: Oropharynx is clear.   Eyes:      Pupils: Pupils are equal, round, and reactive to light.   Neck:      Vascular: No carotid bruit.   Cardiovascular:      Rate and Rhythm: Normal rate. Rhythm irregular.      Pulses: Normal pulses.      Heart sounds: Normal heart sounds.   Pulmonary:      Effort: Pulmonary effort is normal.      Breath sounds: Normal breath sounds.   Abdominal:      Palpations: Abdomen is soft.   Musculoskeletal:         General: Normal range of motion.      Cervical back: Normal range of motion and neck supple.   Skin:     General: Skin is warm and dry.      Capillary Refill: Capillary refill takes less than 2 seconds.   Neurological:      General: No focal deficit present.      Mental Status: She is alert and oriented to person, place, and time.   Psychiatric:         Mood and Affect: Mood normal.         Behavior: Behavior normal.         Thought Content: Thought content normal.         Judgment: Judgment normal.          PAT AIRWAY:   Airway:     Mallampati::  IV    TM distance::  <3 FB    Neck ROM::  Full  normal        There were no vitals taken for this visit.    DASI Risk Score    No data to display       Caprini DVT Assessment    No data to display       Modified Frailty Index    No data to display       CHADS2 Stroke Risk  Current as of about an hour ago        N/A 3 to 100%: High Risk   2 to < 3%: Medium Risk   0 to < 2%: Low Risk     Last Change: N/A          This score determines the patient's risk of having a stroke if the patient has atrial fibrillation.        This score is  not applicable to this patient. Components are not calculated.          Revised Cardiac Risk Index    No data to display       Apfel Simplified Score    No data to display       Risk Analysis Index Results This Encounter    No data found in the last 1 encounters.         Assessment and Plan:     Anesthesia:  The patient denies problems with anesthesia in the past such as PONV, prolonged sedation, awareness, dental damage, aspiration, cardiac arrest, difficult intubation, or unexpected hospital admissions.     Neuro:   The patient has no neurological diagnoses or significant findings on chart review, clinical presentation, and evaluation. No grossly apparent neurological perioperative risk. The patient is at increased risk for perioperative stroke secondary to hypertension , increased age, female gender. Handouts for preoperative brain exercises given to patient.    HEENT/Airway  The patient has diagnoses, significant findings on chart review, clinical presentation or evaluation of JACLYN with cpap.    Cardiovascular  Hx LBBB, CAD  RCRI  The patient meets 0-1 RCRI criteria and therefore has a less than 1% risk of major adverse cardiac complications.  METS  The patient's functional capacity capacity is greater than 4 METS.  EKG  6/4/24  Normal Sinus Rhythm with sinus arrhythmia  Left axis deviation  Left bundle branch block  Abnormal ECG  Rate 69    Hypertension Evaluation  The patient has a known history of hypertension that is controlled on amlodipine    Cardiology Evaluation  The patient is not followed by cardiology.    RACHEL score which indicates a 0.3% risk of intraoperative or 30-day postoperative.    Pulmonary   The patient has findings on chart review, clinical presentation and evaluation significant for JACLYN compliant with Cpap, COPD LABA Advair albuterol as needed, pulmonary nodule.    The patient has a stop bang score of 3, which places patient at intermediate risk for having JACLYN.    ARISCAT 32, Intermediate,  13.3% risk of in-hospital postoperative pulmonary complications  PRODIGY 21, high risk of respiratory depression episode. Patient given PI sheet for preoperative deep breathing exercises.    Hematology  No diagnoses or significant findings on chart review or clinical presentation and evaluation.  Antiplatelet management   The patient is not currently receiving antiplatelet therapy.  Anticoagulation management  The patient is not currently receiving anticoagulation therapy. Patient provided with DVT educational handout.      Caprini score 10, high risk of perioperative VTE.     Patient instructed to ambulate as soon as possible postoperatively to decrease thromboembolic risk. Initiate mechanical DVT prophylaxis as soon as possible and initiate chemical prophylaxis when deemed safe from a bleeding standpoint post surgery.       Gastrointestinal  No diagnoses or significant findings on chart review or clinical presentation and evaluation.  Eat 10- 0,  self-perceived oropharyngeal dysphagia scale (0-40)     Genitourinary  The patient has diagnoses or significant findings on chart review or clinical presentation and evaluation significant for genital prolapse , on fosamax    Renal  The patient has no known history of chronic kidney disease.    Musculoskeletal  The patient has diagnoses or significant findings on chart review or clinical presentation and evaluation significant for history of OA, bilateral knee replacements,     Endocrine  Diabetes Evaluation  The patient has no history of diabetes mellitus.  Thyroid Disease Evaluation  The patient has no history of thyroid disease.    ID  No diagnoses or significant findings on chart review or clinical presentation and evaluation.    -Preoperative medication instructions were provided and reviewed with the patient.  Any additional testing or evaluation was explained to the patient.  NPO Instructions were discussed, and the patient's questions were answered prior to  conclusion of this encounter.

## 2024-06-04 NOTE — CPM/PAT H&P
CPM/PAT Evaluation       Name: Jackie Matthews (Jackie MARCELLE Matthews)  /Age: 1940/83 y.o.     In-Person       Chief Complaint: Evaluation prior to surgery    HPI  83 year old female scheduled for COLPOCLEISIS  POSTERIOR REPAIR on 24 with Dr. Lee secondary to Female genital prolapse. PMHx includes HTN, HLD, COPD, JACLYN. Presents to CPM today for preoperative risk stratification and optimization.   Past Medical History:   Diagnosis Date    Personal history of (healed) traumatic fracture 2019    History of fracture of pelvis    Personal history of other diseases of the circulatory system     History of hypertension    Personal history of other diseases of the musculoskeletal system and connective tissue     History of osteoarthritis    Personal history of other diseases of the respiratory system     History of chronic obstructive lung disease    Personal history of other diseases of the respiratory system     History of pulmonary emphysema    Personal history of other endocrine, nutritional and metabolic disease     History of high cholesterol    Personal history of other specified conditions     History of shortness of breath    Personal history of other specified conditions     History of snoring       Past Surgical History:   Procedure Laterality Date    CATARACT EXTRACTION  2017    Cataract Surgery    OTHER SURGICAL HISTORY  2021    Tonsillectomy    OTHER SURGICAL HISTORY  2021    Knee replacement    TOTAL KNEE ARTHROPLASTY  2017    Total Knee Arthroplasty       Patient  has no history on file for sexual activity.    Family History   Problem Relation Name Age of Onset    Alzheimer's disease Mother      Other (Sepsis) Mother      Dementia Father      Dementia Sister      Sleep apnea Brother         Allergies   Allergen Reactions    Losartan Diarrhea    Sulfa (Sulfonamide Antibiotics) Rash       Prior to Admission medications    Medication Sig Start Date End Date Taking?  Authorizing Provider   albuterol 90 mcg/actuation inhaler inhale 2 puffs by mouth every 4 to 6 hours as needed 9/11/23   Ana Rosa Brunner MD   alendronate (Fosamax) 70 mg tablet TAKE ONE TABLET BY MOUTH ONCE A WEEK 4/2/24   Ana Rosa Brunner MD   amLODIPine (Norvasc) 10 mg tablet TAKE ONE TABLET BY MOUTH EVERY DAY 4/2/24   Ana Rosa Brunner MD   ascorbic acid, vitamin C, 500 mg capsule Vitamin C 500 MG Oral Capsule   Refills: 0        Start : 10-Aug-2021   Active 8/10/21   Historical Provider, MD   cholecalciferol (Vitamin D-3) 25 MCG (1000 UT) capsule Vitamin D3 25 MCG (1000 UT) Oral Capsule   Refills: 0        Start : 10-Aug-2021   Active 8/10/21   Historical Provider, MD   fluticasone propion-salmeteroL (Advair Diskus) 250-50 mcg/dose diskus inhaler INHALE ONE PUFF BY MOUTH TWO TIMES A DAY 2/13/24   Ana Rosa Brunner MD   metoprolol succinate XL (Toprol-XL) 25 mg 24 hr tablet TAKE ONE-HALF TABLET BY MOUTH ONCE A DAY 5/29/24   Ana Rosa Brunner MD   omeprazole (PriLOSEC) 40 mg DR capsule Take 1 capsule (40 mg) by mouth once daily. 2/3/22   Historical Provider, MD   pravastatin (Pravachol) 20 mg tablet TAKE ONE TABLET BY MOUTH EVERY DAY 5/29/24   Ana Rosa Brunner MD   tamsulosin (Flomax) 0.4 mg 24 hr capsule Take 3 days before surgery and 7 days after 6/4/24   Fernando Lee MD   triamcinolone (Kenalog) 0.1 % cream Apply topically 2 times a day. Apply to affected area 1-2 times daily as needed. 7/26/23   Ana Rosa Brunner MD   vibegron (Gemtesa) 75 mg tablet Take 1 tablet (75 mg) by mouth early in the morning.. LOT [id number]  EXP June 2026 3/25/24 6/17/24  Cathi Kauffman PA-C   metoprolol succinate XL (Toprol-XL) 25 mg 24 hr tablet TAKE ONE-HALF TABLET BY MOUTH ONCE A DAY 5/14/24 5/29/24  Ana Rosa Brunner MD   pravastatin (Pravachol) 20 mg tablet TAKE ONE TABLET BY MOUTH EVERY DAY 5/14/24 5/29/24  Ana Rosa Brunner MD        [facility] ROS:   Constitutional:   neg    Neuro/Psych:   neg    Eyes:   neg    Ears:   neg    Nose:   Mouth:   neg     Throat:   Neck:   neg    Cardio:   neg    Respiratory:   neg    Endocrine:   GI:   neg    :    dysuria   polyuria  Musculoskeletal:   neg    Hematologic:   neg    Skin:  neg        Physical Exam  Vitals reviewed.   Constitutional:       Appearance: Normal appearance.   HENT:      Head: Normocephalic and atraumatic.      Nose: Nose normal.      Mouth/Throat:      Mouth: Mucous membranes are moist.      Pharynx: Oropharynx is clear.   Eyes:      Pupils: Pupils are equal, round, and reactive to light.   Neck:      Vascular: No carotid bruit.   Cardiovascular:      Rate and Rhythm: Normal rate. Rhythm irregular.      Pulses: Normal pulses.      Heart sounds: Normal heart sounds.   Pulmonary:      Effort: Pulmonary effort is normal.      Breath sounds: Normal breath sounds.   Abdominal:      Palpations: Abdomen is soft.   Musculoskeletal:         General: Normal range of motion.      Cervical back: Normal range of motion and neck supple.   Skin:     General: Skin is warm and dry.      Capillary Refill: Capillary refill takes less than 2 seconds.   Neurological:      General: No focal deficit present.      Mental Status: She is alert and oriented to person, place, and time.   Psychiatric:         Mood and Affect: Mood normal.         Behavior: Behavior normal.         Thought Content: Thought content normal.         Judgment: Judgment normal.          PAT AIRWAY:   Airway:     Mallampati::  IV    TM distance::  <3 FB    Neck ROM::  Full  normal        There were no vitals taken for this visit.    DASI Risk Score    No data to display       Caprini DVT Assessment    No data to display       Modified Frailty Index    No data to display       CHADS2 Stroke Risk  Current as of about an hour ago        N/A 3 to 100%: High Risk   2 to < 3%: Medium Risk   0 to < 2%: Low Risk     Last Change: N/A          This score determines the patient's risk of having a stroke if the patient has atrial fibrillation.        This score is  not applicable to this patient. Components are not calculated.          Revised Cardiac Risk Index    No data to display       Apfel Simplified Score    No data to display       Risk Analysis Index Results This Encounter    No data found in the last 1 encounters.         Assessment and Plan:     Anesthesia:  The patient denies problems with anesthesia in the past such as PONV, prolonged sedation, awareness, dental damage, aspiration, cardiac arrest, difficult intubation, or unexpected hospital admissions.     Neuro:   The patient has no neurological diagnoses or significant findings on chart review, clinical presentation, and evaluation. No grossly apparent neurological perioperative risk. The patient is at increased risk for perioperative stroke secondary to hypertension , increased age, female gender. Handouts for preoperative brain exercises given to patient.    HEENT/Airway  The patient has diagnoses, significant findings on chart review, clinical presentation or evaluation of JACLYN with cpap.    Cardiovascular  Hx LBBB, CAD  RCRI  The patient meets 0-1 RCRI criteria and therefore has a less than 1% risk of major adverse cardiac complications.  METS  The patient's functional capacity capacity is greater than 4 METS.  EKG  6/4/24  Normal Sinus Rhythm with sinus arrhythmia  Left axis deviation  Left bundle branch block  Abnormal ECG  Rate 69    Hypertension Evaluation  The patient has a known history of hypertension that is controlled on amlodipine    Cardiology Evaluation  The patient is not followed by cardiology.    RACHEL score which indicates a 0.3% risk of intraoperative or 30-day postoperative.    Pulmonary   The patient has findings on chart review, clinical presentation and evaluation significant for JACLYN compliant with Cpap, COPD LABA Advair albuterol as needed, pulmonary nodule.    The patient has a stop bang score of 3, which places patient at intermediate risk for having JACLYN.    ARISCAT 32, Intermediate,  13.3% risk of in-hospital postoperative pulmonary complications  PRODIGY 21, high risk of respiratory depression episode. Patient given PI sheet for preoperative deep breathing exercises.    Hematology  No diagnoses or significant findings on chart review or clinical presentation and evaluation.  Antiplatelet management   The patient is not currently receiving antiplatelet therapy.  Anticoagulation management  The patient is not currently receiving anticoagulation therapy. Patient provided with DVT educational handout.      Caprini score 10, high risk of perioperative VTE.     Patient instructed to ambulate as soon as possible postoperatively to decrease thromboembolic risk. Initiate mechanical DVT prophylaxis as soon as possible and initiate chemical prophylaxis when deemed safe from a bleeding standpoint post surgery.       Gastrointestinal  No diagnoses or significant findings on chart review or clinical presentation and evaluation.  Eat 10- 0,  self-perceived oropharyngeal dysphagia scale (0-40)     Genitourinary  The patient has diagnoses or significant findings on chart review or clinical presentation and evaluation significant for genital prolapse , on fosamax    Renal  The patient has no known history of chronic kidney disease.    Musculoskeletal  The patient has diagnoses or significant findings on chart review or clinical presentation and evaluation significant for history of OA, bilateral knee replacements,     Endocrine  Diabetes Evaluation  The patient has no history of diabetes mellitus.  Thyroid Disease Evaluation  The patient has no history of thyroid disease.    ID  No diagnoses or significant findings on chart review or clinical presentation and evaluation.    -Preoperative medication instructions were provided and reviewed with the patient.  Any additional testing or evaluation was explained to the patient.  NPO Instructions were discussed, and the patient's questions were answered prior to  conclusion of this encounter.

## 2024-06-04 NOTE — PROGRESS NOTES
HISTORY OF PRESENT ILLNESS:  Scheduled for colpocleisis next week.  The procedure was discussed in detail.  She is ready to proceed         Past Medical History  She has a past medical history of Personal history of (healed) traumatic fracture (05/06/2019), Personal history of other diseases of the circulatory system, Personal history of other diseases of the musculoskeletal system and connective tissue, Personal history of other diseases of the respiratory system, Personal history of other diseases of the respiratory system, Personal history of other endocrine, nutritional and metabolic disease, Personal history of other specified conditions, and Personal history of other specified conditions.    Surgical History  She has a past surgical history that includes Total knee arthroplasty (01/08/2017); Cataract extraction (01/08/2017); Other surgical history (08/12/2021); and Other surgical history (08/12/2021).     Social History  She reports that she quit smoking about 16 years ago. Her smoking use included cigarettes. She started smoking about 66 years ago. She has a 12.5 pack-year smoking history. She has been exposed to tobacco smoke. She has never used smokeless tobacco. She reports current alcohol use of about 3.0 standard drinks of alcohol per week. She reports that she does not use drugs.    Family History  Family History   Problem Relation Name Age of Onset    Alzheimer's disease Mother      Other (Sepsis) Mother      Dementia Father      Dementia Sister      Sleep apnea Brother          Allergies  Losartan and Sulfa (sulfonamide antibiotics)      A comprehensive 10+ review of systems was negative except for: see hpi                          PHYSICAL EXAMINATION:  BP Readings from Last 3 Encounters:   03/25/24 148/82   03/19/24 144/84   12/18/23 120/74      Wt Readings from Last 3 Encounters:   03/19/24 56.2 kg (124 lb)   12/18/23 54.6 kg (120 lb 6.4 oz)   10/30/23 53.5 kg (118 lb)      BMI:  "Estimated body mass index is 22.68 kg/m² as calculated from the following:    Height as of 3/19/24: 1.575 m (5' 2\").    Weight as of 3/19/24: 56.2 kg (124 lb).  BSA: Estimated body surface area is 1.57 meters squared as calculated from the following:    Height as of 3/19/24: 1.575 m (5' 2\").    Weight as of 3/19/24: 56.2 kg (124 lb).  HEENT: Normocephalic, atraumatic, PER EOMI, nonicteric, trachea normal, thyroid normal, oropharynx normal.  CARDIAC: regular rate & rhythm, S1 & S2 normal.  No heaves, thrills, gallops or murmurs.  LUNGS: Clear to auscultation, no spinal or CV tenderness.  EXTREMITIES: No evidence of cyanosis, clubbing or edema.               Assessment:    83 y.o.  presents with POP, FERNY      uterovaginal prolapse:  Planning on [place] colpocleisis with levator myorrhaphy next week    Flomax prescribed          FERNY/OAB: Urge dominant   UDS demonstrates no evidence of stress incontinence, she will not need a sling    Will reassess bladder function after surgery        All questions and concerns were answered and addressed.  The patient expressed understanding and agrees with the plan.     Fernando Lee MD      "

## 2024-06-04 NOTE — PREPROCEDURE INSTRUCTIONS
Medication List            Accurate as of June 4, 2024  2:55 PM. Always use your most recent med list.                albuterol 90 mcg/actuation inhaler  inhale 2 puffs by mouth every 4 to 6 hours as needed  Notes to patient: Use if needed     alendronate 70 mg tablet  Commonly known as: Fosamax  TAKE ONE TABLET BY MOUTH ONCE A WEEK  Medication Adjustments for Surgery: Stop 1 day before surgery     amLODIPine 10 mg tablet  Commonly known as: Norvasc  TAKE ONE TABLET BY MOUTH EVERY DAY  Medication Adjustments for Surgery: Take morning of surgery with sip of water, no other fluids     ascorbic acid (vitamin C) 500 mg capsule  Medication Adjustments for Surgery: Stop 7 days before surgery     cholecalciferol 25 MCG (1000 UT) capsule  Commonly known as: Vitamin D-3  Medication Adjustments for Surgery: Stop 7 days before surgery     fluticasone propion-salmeteroL 250-50 mcg/dose diskus inhaler  Commonly known as: Advair Diskus  INHALE ONE PUFF BY MOUTH TWO TIMES A DAY  Notes to patient: Use am of surgery     ibuprofen 200 mg tablet  Medication Adjustments for Surgery: Stop 7 days before surgery     metoprolol succinate XL 25 mg 24 hr tablet  Commonly known as: Toprol-XL  TAKE ONE-HALF TABLET BY MOUTH ONCE A DAY  Medication Adjustments for Surgery: Take morning of surgery with sip of water, no other fluids     omeprazole 40 mg DR capsule  Commonly known as: PriLOSEC  Medication Adjustments for Surgery: Take morning of surgery with sip of water, no other fluids     pravastatin 20 mg tablet  Commonly known as: Pravachol  TAKE ONE TABLET BY MOUTH EVERY DAY  Medication Adjustments for Surgery: Take morning of surgery with sip of water, no other fluids     tamsulosin 0.4 mg 24 hr capsule  Commonly known as: Flomax  Take 3 days before surgery and 7 days after  Medication Adjustments for Surgery: Take morning of surgery with sip of water, no other fluids            SURGERY PRE-OPERATIVE INSTRUCTIONS    *You will receive a  phone call the day before your procedure  after 2pm, (or the Friday before your surgery if scheduled on a Monday.) Generally the hospital will be calling you with this information after that time.    *You are not to eat after midnight the night before the surgery. You may have 8oz of a clear liquid up until 2 hours prior to arriving to the hospital. The exception is with medications you were instructed to take day of surgery.    *You may take tylenol for pain/discomfort as needed.     *Stop taking all aspirin products, ibuprofen (motrin/advil), naproxen (aleve/naprosyn) for one week prior to surgery.    *Stop taking all vitamins and supplements one week prior to surgery.     *You should not have alcoholic beverages for 24 hours before surgery.     *You should not smoke 24 hours prior to surgery.     *To help prevent surgical infections bathe/shower with Dial soap the evening before surgery.    *You can wear deodorant but no lotion, powder, or perfume/cologne. You should remove all make-up and nail polish at home.    *If you wear glasses, please bring a case for the glasses with you.    *You will be asked to remove dentures and contacts.     *Please leave all valuables at home.    *You should wear loose, comfortable clothing that will accommodate bandages and/or casts.    *You should notify your doctor of any change in your condition (fever, cold, rash, etc). Surgery may need to be re-scheduled until a time you are in better health.    *A responsible adult is required to accompany you to and from the hospital if you are receiving anesthesia or a sedative. Patients are not permitted to drive for 24 hours after anesthesia.     *You can use the [facility] parking if you wish.     *If you have any further questions please call [facility] [phone].                   NPO Instructions:    Do not eat any food after midnight the night before your surgery/procedure.    Additional Instructions:     Review your medication instructions,  stop indicated medications

## 2024-06-10 ENCOUNTER — ANESTHESIA EVENT (OUTPATIENT)
Dept: OPERATING ROOM | Facility: HOSPITAL | Age: 84
End: 2024-06-10
Payer: MEDICARE

## 2024-06-10 LAB
ATRIAL RATE: 69 BPM
P AXIS: 56 DEGREES
P OFFSET: 171 MS
P ONSET: 116 MS
PR INTERVAL: 190 MS
Q ONSET: 211 MS
QRS COUNT: 11 BEATS
QRS DURATION: 128 MS
QT INTERVAL: 416 MS
QTC CALCULATION(BAZETT): 445 MS
QTC FREDERICIA: 436 MS
R AXIS: -55 DEGREES
T AXIS: 87 DEGREES
T OFFSET: 419 MS
VENTRICULAR RATE: 69 BPM

## 2024-06-12 ENCOUNTER — ANESTHESIA (OUTPATIENT)
Dept: OPERATING ROOM | Facility: HOSPITAL | Age: 84
End: 2024-06-12
Payer: MEDICARE

## 2024-06-12 ENCOUNTER — HOSPITAL ENCOUNTER (OUTPATIENT)
Facility: HOSPITAL | Age: 84
Setting detail: OUTPATIENT SURGERY
Discharge: HOME | End: 2024-06-12
Attending: STUDENT IN AN ORGANIZED HEALTH CARE EDUCATION/TRAINING PROGRAM | Admitting: STUDENT IN AN ORGANIZED HEALTH CARE EDUCATION/TRAINING PROGRAM
Payer: MEDICARE

## 2024-06-12 VITALS
BODY MASS INDEX: 23.17 KG/M2 | DIASTOLIC BLOOD PRESSURE: 69 MMHG | HEART RATE: 75 BPM | TEMPERATURE: 99 F | OXYGEN SATURATION: 90 % | WEIGHT: 125.88 LBS | RESPIRATION RATE: 16 BRPM | HEIGHT: 62 IN | SYSTOLIC BLOOD PRESSURE: 137 MMHG

## 2024-06-12 DIAGNOSIS — N81.9 FEMALE GENITAL PROLAPSE, UNSPECIFIED TYPE: Primary | ICD-10-CM

## 2024-06-12 DIAGNOSIS — G89.18 POSTOPERATIVE PAIN: ICD-10-CM

## 2024-06-12 PROCEDURE — 3700000002 HC GENERAL ANESTHESIA TIME - EACH INCREMENTAL 1 MINUTE: Performed by: STUDENT IN AN ORGANIZED HEALTH CARE EDUCATION/TRAINING PROGRAM

## 2024-06-12 PROCEDURE — 7100000010 HC PHASE TWO TIME - EACH INCREMENTAL 1 MINUTE: Performed by: STUDENT IN AN ORGANIZED HEALTH CARE EDUCATION/TRAINING PROGRAM

## 2024-06-12 PROCEDURE — 3600000003 HC OR TIME - INITIAL BASE CHARGE - PROCEDURE LEVEL THREE: Performed by: STUDENT IN AN ORGANIZED HEALTH CARE EDUCATION/TRAINING PROGRAM

## 2024-06-12 PROCEDURE — 57120 COLPOCLEISIS LE FORT TYPE: CPT | Performed by: STUDENT IN AN ORGANIZED HEALTH CARE EDUCATION/TRAINING PROGRAM

## 2024-06-12 PROCEDURE — 2500000005 HC RX 250 GENERAL PHARMACY W/O HCPCS: Performed by: NURSE ANESTHETIST, CERTIFIED REGISTERED

## 2024-06-12 PROCEDURE — 57250 REPAIR RECTUM & VAGINA: CPT | Performed by: STUDENT IN AN ORGANIZED HEALTH CARE EDUCATION/TRAINING PROGRAM

## 2024-06-12 PROCEDURE — 7100000002 HC RECOVERY ROOM TIME - EACH INCREMENTAL 1 MINUTE: Performed by: STUDENT IN AN ORGANIZED HEALTH CARE EDUCATION/TRAINING PROGRAM

## 2024-06-12 PROCEDURE — 2500000001 HC RX 250 WO HCPCS SELF ADMINISTERED DRUGS (ALT 637 FOR MEDICARE OP): Performed by: STUDENT IN AN ORGANIZED HEALTH CARE EDUCATION/TRAINING PROGRAM

## 2024-06-12 PROCEDURE — 2500000005 HC RX 250 GENERAL PHARMACY W/O HCPCS: Performed by: STUDENT IN AN ORGANIZED HEALTH CARE EDUCATION/TRAINING PROGRAM

## 2024-06-12 PROCEDURE — 2500000004 HC RX 250 GENERAL PHARMACY W/ HCPCS (ALT 636 FOR OP/ED): Performed by: ANESTHESIOLOGY

## 2024-06-12 PROCEDURE — 2720000007 HC OR 272 NO HCPCS: Performed by: STUDENT IN AN ORGANIZED HEALTH CARE EDUCATION/TRAINING PROGRAM

## 2024-06-12 PROCEDURE — 2500000004 HC RX 250 GENERAL PHARMACY W/ HCPCS (ALT 636 FOR OP/ED): Performed by: NURSE ANESTHETIST, CERTIFIED REGISTERED

## 2024-06-12 PROCEDURE — 7100000009 HC PHASE TWO TIME - INITIAL BASE CHARGE: Performed by: STUDENT IN AN ORGANIZED HEALTH CARE EDUCATION/TRAINING PROGRAM

## 2024-06-12 PROCEDURE — 3600000008 HC OR TIME - EACH INCREMENTAL 1 MINUTE - PROCEDURE LEVEL THREE: Performed by: STUDENT IN AN ORGANIZED HEALTH CARE EDUCATION/TRAINING PROGRAM

## 2024-06-12 PROCEDURE — 3700000001 HC GENERAL ANESTHESIA TIME - INITIAL BASE CHARGE: Performed by: STUDENT IN AN ORGANIZED HEALTH CARE EDUCATION/TRAINING PROGRAM

## 2024-06-12 PROCEDURE — 7100000001 HC RECOVERY ROOM TIME - INITIAL BASE CHARGE: Performed by: STUDENT IN AN ORGANIZED HEALTH CARE EDUCATION/TRAINING PROGRAM

## 2024-06-12 RX ORDER — DOCUSATE SODIUM 100 MG/1
100 CAPSULE, LIQUID FILLED ORAL 2 TIMES DAILY
Qty: 60 CAPSULE | Refills: 0 | Status: SHIPPED | OUTPATIENT
Start: 2024-06-12 | End: 2024-07-12

## 2024-06-12 RX ORDER — CELECOXIB 100 MG/1
200 CAPSULE ORAL ONCE
Status: COMPLETED | OUTPATIENT
Start: 2024-06-12 | End: 2024-06-12

## 2024-06-12 RX ORDER — KETOROLAC TROMETHAMINE 10 MG/1
10 TABLET, FILM COATED ORAL EVERY 6 HOURS PRN
Qty: 20 TABLET | Refills: 0 | Status: SHIPPED | OUTPATIENT
Start: 2024-06-12

## 2024-06-12 RX ORDER — PHENAZOPYRIDINE HYDROCHLORIDE 100 MG/1
200 TABLET, FILM COATED ORAL ONCE
Status: COMPLETED | OUTPATIENT
Start: 2024-06-12 | End: 2024-06-12

## 2024-06-12 RX ORDER — LIDOCAINE HYDROCHLORIDE AND EPINEPHRINE 10; 10 MG/ML; UG/ML
INJECTION, SOLUTION INFILTRATION; PERINEURAL AS NEEDED
Status: DISCONTINUED | OUTPATIENT
Start: 2024-06-12 | End: 2024-06-12 | Stop reason: HOSPADM

## 2024-06-12 RX ORDER — CEFAZOLIN 1 G/1
INJECTION, POWDER, FOR SOLUTION INTRAVENOUS AS NEEDED
Status: DISCONTINUED | OUTPATIENT
Start: 2024-06-12 | End: 2024-06-12

## 2024-06-12 RX ORDER — CEFAZOLIN SODIUM 2 G/100ML
2 INJECTION, SOLUTION INTRAVENOUS ONCE
Status: DISCONTINUED | OUTPATIENT
Start: 2024-06-12 | End: 2024-06-12 | Stop reason: HOSPADM

## 2024-06-12 RX ORDER — DROPERIDOL 2.5 MG/ML
0.62 INJECTION, SOLUTION INTRAMUSCULAR; INTRAVENOUS ONCE AS NEEDED
Status: DISCONTINUED | OUTPATIENT
Start: 2024-06-12 | End: 2024-06-12 | Stop reason: HOSPADM

## 2024-06-12 RX ORDER — ONDANSETRON HYDROCHLORIDE 2 MG/ML
INJECTION, SOLUTION INTRAVENOUS AS NEEDED
Status: DISCONTINUED | OUTPATIENT
Start: 2024-06-12 | End: 2024-06-12

## 2024-06-12 RX ORDER — PROPOFOL 10 MG/ML
INJECTION, EMULSION INTRAVENOUS AS NEEDED
Status: DISCONTINUED | OUTPATIENT
Start: 2024-06-12 | End: 2024-06-12

## 2024-06-12 RX ORDER — ONDANSETRON HYDROCHLORIDE 2 MG/ML
4 INJECTION, SOLUTION INTRAVENOUS ONCE AS NEEDED
Status: DISCONTINUED | OUTPATIENT
Start: 2024-06-12 | End: 2024-06-12 | Stop reason: HOSPADM

## 2024-06-12 RX ORDER — SODIUM CHLORIDE, SODIUM LACTATE, POTASSIUM CHLORIDE, CALCIUM CHLORIDE 600; 310; 30; 20 MG/100ML; MG/100ML; MG/100ML; MG/100ML
100 INJECTION, SOLUTION INTRAVENOUS CONTINUOUS
Status: DISCONTINUED | OUTPATIENT
Start: 2024-06-12 | End: 2024-06-12 | Stop reason: HOSPADM

## 2024-06-12 RX ORDER — POLYETHYLENE GLYCOL 3350 17 G/17G
17 POWDER, FOR SOLUTION ORAL DAILY
Qty: 510 G | Refills: 0 | Status: SHIPPED | OUTPATIENT
Start: 2024-06-12 | End: 2024-07-12

## 2024-06-12 RX ORDER — OXYCODONE HYDROCHLORIDE 5 MG/1
5 TABLET ORAL EVERY 4 HOURS PRN
Status: DISCONTINUED | OUTPATIENT
Start: 2024-06-12 | End: 2024-06-12 | Stop reason: HOSPADM

## 2024-06-12 RX ORDER — FENTANYL CITRATE 50 UG/ML
INJECTION, SOLUTION INTRAMUSCULAR; INTRAVENOUS AS NEEDED
Status: DISCONTINUED | OUTPATIENT
Start: 2024-06-12 | End: 2024-06-12

## 2024-06-12 RX ORDER — TRAMADOL HYDROCHLORIDE 50 MG/1
50 TABLET ORAL EVERY 8 HOURS PRN
Qty: 10 TABLET | Refills: 0 | Status: SHIPPED | OUTPATIENT
Start: 2024-06-12

## 2024-06-12 RX ORDER — ACETAMINOPHEN 500 MG
1000 TABLET ORAL EVERY 6 HOURS PRN
Qty: 30 TABLET | Refills: 0 | Status: SHIPPED | OUTPATIENT
Start: 2024-06-12

## 2024-06-12 RX ORDER — ACETAMINOPHEN 325 MG/1
650 TABLET ORAL ONCE
Status: COMPLETED | OUTPATIENT
Start: 2024-06-12 | End: 2024-06-12

## 2024-06-12 RX ORDER — LIDOCAINE HYDROCHLORIDE 20 MG/ML
INJECTION, SOLUTION INFILTRATION; PERINEURAL AS NEEDED
Status: DISCONTINUED | OUTPATIENT
Start: 2024-06-12 | End: 2024-06-12

## 2024-06-12 SDOH — HEALTH STABILITY: MENTAL HEALTH: CURRENT SMOKER: 0

## 2024-06-12 ASSESSMENT — PAIN SCALES - GENERAL
PAINLEVEL_OUTOF10: 0 - NO PAIN
PAINLEVEL_OUTOF10: 10 - WORST POSSIBLE PAIN
PAINLEVEL_OUTOF10: 0 - NO PAIN
PAINLEVEL_OUTOF10: 0 - NO PAIN

## 2024-06-12 ASSESSMENT — PAIN - FUNCTIONAL ASSESSMENT
PAIN_FUNCTIONAL_ASSESSMENT: 0-10

## 2024-06-12 ASSESSMENT — COLUMBIA-SUICIDE SEVERITY RATING SCALE - C-SSRS
2. HAVE YOU ACTUALLY HAD ANY THOUGHTS OF KILLING YOURSELF?: NO
6. HAVE YOU EVER DONE ANYTHING, STARTED TO DO ANYTHING, OR PREPARED TO DO ANYTHING TO END YOUR LIFE?: NO
1. IN THE PAST MONTH, HAVE YOU WISHED YOU WERE DEAD OR WISHED YOU COULD GO TO SLEEP AND NOT WAKE UP?: NO

## 2024-06-12 NOTE — ANESTHESIA PROCEDURE NOTES
Airway  Date/Time: 6/12/2024 3:07 PM  Urgency: elective    Airway not difficult    Staffing  Performed: CRNA   Authorized by: Kiko Guzman DO    Performed by: ABAD Leslie-PALMER  Patient location during procedure: OR    Indications and Patient Condition  Indications for airway management: anesthesia  Spontaneous Ventilation: absent  Sedation level: deep  Preoxygenated: yes  Patient position: sniffing  Mask difficulty assessment: 0 - not attempted    Final Airway Details  Final airway type: supraglottic airway      Successful airway: Supraglottic airway: iGel.  Size 3     Number of attempts at approach: 1

## 2024-06-12 NOTE — ANESTHESIA POSTPROCEDURE EVALUATION
Patient: Jackie Matthews    Procedure Summary       Date: 06/12/24 Room / Location: [facility] OR 05 / Virtual [facility] OR    Anesthesia Start: 1500 Anesthesia Stop: 1623    Procedures:       COLPOCLEISIS      POSTERIOR REPAIR Diagnosis:       Female genital prolapse, unspecified type      (Female genital prolapse, unspecified type [N81.9])    Surgeons: Fernando Lee MD Responsible Provider: DOLLY Leslie    Anesthesia Type: general ASA Status: 3            Anesthesia Type: general    Vitals Value Taken Time   BP See vitals 06/12/24 1649   Temp  06/12/24 1649   Pulse  06/12/24 1649   Resp  06/12/24 1649   SpO2  06/12/24 1649       Anesthesia Post Evaluation    Patient location during evaluation: PACU  Patient participation: complete - patient participated  Level of consciousness: awake  Pain management: adequate  Multimodal analgesia pain management approach  Airway patency: patent  Two or more strategies used to mitigate risk of obstructive sleep apnea  Cardiovascular status: acceptable  Respiratory status: acceptable  Hydration status: acceptable  Postoperative Nausea and Vomiting: none        No notable events documented.

## 2024-06-12 NOTE — OP NOTE
COLPOCLEISIS, POSTERIOR REPAIR Operative Note     Date: 2024  OR Location: [facility] OR    Name: Jackie Matthews : 1940, Age: 83 y.o., MRN: [id number], Sex: female    Diagnosis  Pre-op Diagnosis     * Female genital prolapse, unspecified type [N81.9] Post-op Diagnosis     * Female genital prolapse, unspecified type [N81.9]     Procedures  COLPOCLEISIS  86628 - [place] COLPOCLEISIS LE FORT TYPE    POSTERIOR REPAIR  60351 - [place] POST COLPORRHAPHY RECTOCELE W/WO PERINEORRHAPHY      Surgeons      * Fernando Lee - Primary    Resident/Fellow/Other Assistant:  Surgeons and Role:  * No surgeons found with a matching role *    Procedure Summary  Anesthesia: Consult  ASA: III  Anesthesia Staff: Anesthesiologist: Kiko Guzman DO; Shawn Stratton MD  CRNA: Annemarie Hays APRN-CRNA; ABAD Leslie-CRNA  Estimated Blood Loss: 10 mL  Intra-op Medications:   Administrations occurring from 1500 to 1630 on 24:   Medication Name Total Dose   lidocaine-epinephrine (Xylocaine W/EPI) 1 %-1:100,000 injection 17 mL   lactated Ringer's infusion 337.5 mL              Anesthesia Record               Intraprocedure I/O Totals       None           Specimen: No specimens collected     Staff:   Circulator: Aditi Calvo Person: Pamela Danielle Circulator: Jaelyn         Drains and/or Catheters:   Urethral Catheter 16 Fr. (Active)       Tourniquet Times:         Implants:     Findings: stage IV POP     Indications: Jackie Matthews is an 83 y.o. female who is having surgery for Female genital prolapse, unspecified type [N81.9].     The patient was seen in the preoperative area. The risks, benefits, complications, treatment options, non-operative alternatives, expected recovery and outcomes were discussed with the patient. The possibilities of reaction to medication, pulmonary aspiration, injury to surrounding structures, bleeding, recurrent infection, the need for additional procedures, failure to diagnose a condition, and  creating a complication requiring transfusion or operation were discussed with the patient. The patient concurred with the proposed plan, giving informed consent.  The site of surgery was properly noted/marked if necessary per policy. The patient has been actively warmed in preoperative area. Preoperative antibiotics have been ordered and given within 1 hours of incision. Venous thrombosis prophylaxis have been ordered including bilateral sequential compression devices    Procedure Details:   Patient was taken to the operating room. She was placed under general anesthesia and given 2 g of ancef. She was then placed in dorsal lithotomy, prepped and draped in the usual sterile fashion. An EUA was performed and revealed the findings above. We then began the procedure by grasping the anterior and posterior lips of the cervix with a single tooth tenaculum x 2. We then scored the anterior and posterior vaginal mucosa with the Bovie to margarito the areas of our dissection leaving to 1.5 cm strips of mucosa laterally for the tunnels. Then we injected 10 units of vasopressin in 30 ml of NS anteriorly and posterioly to hydrodisect the mucosa off the vagina. We then completed the dissection of the vaginal mucosa with the bovie and sharply with metzenbaum scissors. Then using 0-vicryl suture we incorporated the vaginal remaining vaginal mucosa to create lateral tunnels for cervical and uterine drainage with single interrupted sutures. This was done bilaterally. Then we created the cervical tunnel incorporating the anterior and posterior lips of the cervix using the same suture. Then using the same suture we imbricated the uterus and vagina to reduce the prolapse with 3 sequential purse string stitches. Once this was completed we closed the anterior and posterior vaginal mucosa to complete the procedure with a resulting vaginal length of approximately 3 cm. Once this was completed, the coppola was removed, we performed a cystoscopy,  the bladder was normal, both UOs and jets were seen. The cystoscope was removed and the coppola was replaced.    We then turned our attention the the levator myorrhaphy. A wide nini incision was made in the distal vagina and perineum and the excess epithelium was dissected and discarded after first injecting the area with 1% lidocaine with epinephrine. The perineal muscles were then dissected off of the epithelium with jay scissors. The muscles were then plicated using vertical mattress sutures until the genital hiatus was 2 cm wide. We then re-approximated the epithelium with a 0-vicryl stitch.      Complications:  None; patient tolerated the procedure well.    Disposition: PACU - hemodynamically stable.  Condition: stable         Additional Details:     Attending Attestation: I was present and scrubbed for the entire procedure.    Fernando Lee  Phone Number: [phone]

## 2024-06-12 NOTE — ANESTHESIA PREPROCEDURE EVALUATION
Patient: Jackie Matthews    Procedure Information       Date/Time: 06/12/24 1500    Procedures:       COLPOCLEISIS      POSTERIOR REPAIR    Location: [facility] / [facility]    Surgeons: Fernando Lee MD            Relevant Problems   Anesthesia (within normal limits)      Cardiac   (+) Hyperlipidemia   (+) Hypertension   (+) LBBB (left bundle branch block)      Pulmonary   (+) COPD (chronic obstructive pulmonary disease) (Multi)   (+) JACLYN (obstructive sleep apnea)   (+) Pneumonia   (+) SOB (shortness of breath) on exertion      Neuro   (+) Atherosclerosis of both carotid arteries      /Renal   (+) Hyponatremia      Liver (within normal limits)      Endocrine (within normal limits)      Musculoskeletal   (+) Osteoarthritis of spine with radiculopathy, lumbosacral region   (+) Spinal stenosis of lumbar region     Vitals:    06/12/24 1327   BP: 156/72   Pulse: 78   Resp: 16   Temp: 37 °C (98.6 °F)   SpO2: 95%       Past Surgical History:   Procedure Laterality Date    CATARACT EXTRACTION  01/08/2017    Cataract Surgery    OTHER SURGICAL HISTORY  08/12/2021    Tonsillectomy    OTHER SURGICAL HISTORY  08/12/2021    Knee replacement    TOTAL KNEE ARTHROPLASTY  01/08/2017    Total Knee Arthroplasty     Past Medical History:   Diagnosis Date    Personal history of (healed) traumatic fracture 05/06/2019    History of fracture of pelvis    Personal history of other diseases of the circulatory system     History of hypertension    Personal history of other diseases of the musculoskeletal system and connective tissue     History of osteoarthritis    Personal history of other diseases of the respiratory system     History of chronic obstructive lung disease    Personal history of other diseases of the respiratory system     History of pulmonary emphysema    Personal history of other endocrine, nutritional and metabolic disease     History of high cholesterol    Personal history of other specified conditions     History of  shortness of breath    Personal history of other specified conditions     History of snoring       Current Facility-Administered Medications:     ceFAZolin in dextrose (iso-os) (Ancef) IVPB 2 g, 2 g, intravenous, Once, Fernando Lee MD    lactated Ringer's infusion, 100 mL/hr, intravenous, Continuous, Shawn Stratton MD, Last Rate: 100 mL/hr at 06/12/24 1400, 100 mL/hr at 06/12/24 1400  Prior to Admission medications    Medication Sig Start Date End Date Taking? Authorizing Provider   albuterol 90 mcg/actuation inhaler inhale 2 puffs by mouth every 4 to 6 hours as needed 9/11/23  Yes Ana Rosa Brunner MD   alendronate (Fosamax) 70 mg tablet TAKE ONE TABLET BY MOUTH ONCE A WEEK 4/2/24  Yes Ana Rosa Brunner MD   amLODIPine (Norvasc) 10 mg tablet TAKE ONE TABLET BY MOUTH EVERY DAY 4/2/24  Yes Ana Rosa Brunner MD   ascorbic acid, vitamin C, 500 mg capsule Vitamin C 500 MG Oral Capsule   Refills: 0        Start : 10-Aug-2021   Active 8/10/21  Yes Historical Provider, MD   cholecalciferol (Vitamin D-3) 25 MCG (1000 UT) capsule Vitamin D3 25 MCG (1000 UT) Oral Capsule   Refills: 0        Start : 10-Aug-2021   Active 8/10/21  Yes Historical Provider, MD   fluticasone propion-salmeteroL (Advair Diskus) 250-50 mcg/dose diskus inhaler INHALE ONE PUFF BY MOUTH TWO TIMES A DAY 2/13/24  Yes Ana Rosa Brunner MD   ibuprofen 200 mg tablet Take by mouth every 6 hours if needed for mild pain (1 - 3).   Yes Historical Provider, MD   metoprolol succinate XL (Toprol-XL) 25 mg 24 hr tablet TAKE ONE-HALF TABLET BY MOUTH ONCE A DAY 5/29/24  Yes Ana Rosa Brunner MD   omeprazole (PriLOSEC) 40 mg DR capsule Take 1 capsule (40 mg) by mouth once daily. 2/3/22  Yes Historical Provider, MD   pravastatin (Pravachol) 20 mg tablet TAKE ONE TABLET BY MOUTH EVERY DAY 5/29/24  Yes Ana Rosa Brunner MD   tamsulosin (Flomax) 0.4 mg 24 hr capsule Take 3 days before surgery and 7 days after 6/4/24  Yes Fernando Lee MD     Allergies   Allergen Reactions    Losartan  "Diarrhea    Sulfa (Sulfonamide Antibiotics) Rash     Social History     Tobacco Use    Smoking status: Former     Current packs/day: 0.00     Average packs/day: 0.3 packs/day for 50.0 years (12.5 ttl pk-yrs)     Types: Cigarettes     Start date:      Quit date:      Years since quittin.4     Passive exposure: Past    Smokeless tobacco: Never   Substance Use Topics    Alcohol use: Yes     Alcohol/week: 3.0 standard drinks of alcohol     Types: 3 Glasses of wine per week         Chemistry    Lab Results   Component Value Date/Time     (L) 2024 1432    K 4.6 2024 1432    CL 96 (L) 2024 1432    CO2 29 2024 1432    BUN 22 2024 1432    CREATININE 0.86 2024 1432    Lab Results   Component Value Date/Time    CALCIUM 9.9 2024 1432    ALKPHOS 66 2023 0939    AST 18 2023 0939    ALT 16 2023 1423    BILITOT 0.5 2023 0939          Lab Results   Component Value Date/Time    WBC 8.5 2024 1432    HGB 12.6 2024 1432    HCT 38.1 2024 1432     2024 1432     No results found for: \"PROTIME\", \"PTT\", \"INR\"  Encounter Date: 24   ECG 12 lead   Result Value    Ventricular Rate 69    Atrial Rate 69    HI Interval 190    QRS Duration 128    QT Interval 416    QTC Calculation(Bazett) 445    P Axis 56    R Axis -55    T Axis 87    QRS Count 11    Q Onset 211    P Onset 116    P Offset 171    T Offset 419    QTC Fredericia 436    Narrative    Normal sinus rhythm with sinus arrhythmia  Left axis deviation  Left bundle branch block  Abnormal ECG  When compared with ECG of 2021 18:00,  PREVIOUS ECG IS PRESENT  Confirmed by Kiko Hood ([id number]) on 6/10/2024 2:23:22 PM     Clinical information reviewed:   Tobacco  Allergies  Meds   Med Hx  Surg Hx   Fam Hx  Soc Hx      Echo 3/2023  EF 50%  Abnormal septal motion  Impaired relaxation pattern  Mild Aortic valve regurgitation  NPO Detail:  NPO/Void Status  Date of Last " Liquid: 06/12/24  Time of Last Liquid: 0700  Date of Last Solid: 06/11/24  Time of Last Solid: 1800         Physical Exam    Airway  Mallampati: III  TM distance: >3 FB  Neck ROM: full     Cardiovascular   Rhythm: regular  Rate: normal     Dental   (+) upper dentures, lower dentures     Pulmonary   Breath sounds clear to auscultation     Abdominal            Anesthesia Plan    History of general anesthesia?: yes  History of complications of general anesthesia?: no    ASA 3     general     The patient is not a current smoker.    intravenous induction   Postoperative administration of opioids is intended.  Trial extubation is planned.  Anesthetic plan and risks discussed with patient.  Use of blood products discussed with patient who consented to blood products.    Plan discussed with CRNA.

## 2024-06-25 NOTE — PROGRESS NOTES
[facility]  Outpatient Clinic Note    Patient: Jackie Matthews  Age/Sex: 83 y.o., female  MRN: [id number]  Virtual Visit: An interactive audio and video telecommunication system which permits real time communications between the patient (at the originating site) and provider (at the distant site) was utilized to provide this telehealth service. Verbal consent was requested and obtained from Jackie Matthews on this date 06/26/2024 for a telehealth visit.     Chief Complaint: 2-week postop visit         History of Present Illness  This is a 83 y.o. female, who presents virtually for her 2-week postop visit.  The patient underwent colpocleisis and posterior repair with Dr. Lee on 6/12/2024. She reports she has been doing well since surgery. She is eating and drinking, as normal. Urine has remained clear, yellow. Bowels have returned to normal. No drainage from incision site. She is ambulating at baseline. No fevers or chills.  The patient stated she is not leaking urine at all, she is very satisfied with the surgery.      Past Medical & Surgical History  Past Medical History:   Diagnosis Date    Personal history of (healed) traumatic fracture 05/06/2019    History of fracture of pelvis    Personal history of other diseases of the circulatory system     History of hypertension    Personal history of other diseases of the musculoskeletal system and connective tissue     History of osteoarthritis    Personal history of other diseases of the respiratory system     History of chronic obstructive lung disease    Personal history of other diseases of the respiratory system     History of pulmonary emphysema    Personal history of other endocrine, nutritional and metabolic disease     History of high cholesterol    Personal history of other specified conditions     History of shortness of breath    Personal history of other specified conditions     History of snoring     Past Surgical History:   Procedure Laterality  Date    CATARACT EXTRACTION  01/08/2017    Cataract Surgery    OTHER SURGICAL HISTORY  08/12/2021    Tonsillectomy    OTHER SURGICAL HISTORY  08/12/2021    Knee replacement    TOTAL KNEE ARTHROPLASTY  01/08/2017    Total Knee Arthroplasty       Family History  Family History   Problem Relation Name Age of Onset    Alzheimer's disease Mother      Other (Sepsis) Mother      Dementia Father      Dementia Sister      Sleep apnea Brother         Social History  She reports that she quit smoking about 16 years ago. Her smoking use included cigarettes. She started smoking about 66 years ago. She has a 12.5 pack-year smoking history. She has been exposed to tobacco smoke. She has never used smokeless tobacco. She reports current alcohol use of about 3.0 standard drinks of alcohol per week. She reports that she does not use drugs.    Allergies  Losartan and Sulfa (sulfonamide antibiotics)    Medications:  Current Outpatient Medications on File Prior to Visit   Medication Sig Dispense Refill    acetaminophen (Tylenol) 500 mg tablet Take 2 tablets (1,000 mg) by mouth every 6 hours if needed for mild pain (1 - 3). 30 tablet 0    albuterol 90 mcg/actuation inhaler inhale 2 puffs by mouth every 4 to 6 hours as needed 25.5 g 0    alendronate (Fosamax) 70 mg tablet TAKE ONE TABLET BY MOUTH ONCE A WEEK 12 tablet 0    amLODIPine (Norvasc) 10 mg tablet TAKE ONE TABLET BY MOUTH EVERY DAY 90 tablet 0    ascorbic acid, vitamin C, 500 mg capsule Vitamin C 500 MG Oral Capsule   Refills: 0        Start : 10-Aug-2021   Active      cholecalciferol (Vitamin D-3) 25 MCG (1000 UT) capsule Vitamin D3 25 MCG (1000 UT) Oral Capsule   Refills: 0        Start : 10-Aug-2021   Active      docusate sodium (Colace) 100 mg capsule Take 1 capsule (100 mg) by mouth 2 times a day. 60 capsule 0    fluticasone propion-salmeteroL (Advair Diskus) 250-50 mcg/dose diskus inhaler INHALE ONE PUFF BY MOUTH TWO TIMES A DAY 60 each 3    ibuprofen 200 mg tablet Take by  mouth every 6 hours if needed for mild pain (1 - 3).      ketorolac (Toradol) 10 mg tablet Take 1 tablet (10 mg) by mouth every 6 hours if needed for moderate pain (4 - 6). 20 tablet 0    metoprolol succinate XL (Toprol-XL) 25 mg 24 hr tablet TAKE ONE-HALF TABLET BY MOUTH ONCE A DAY 45 tablet 0    omeprazole (PriLOSEC) 40 mg DR capsule Take 1 capsule (40 mg) by mouth once daily.      polyethylene glycol (Glycolax, Miralax) 17 gram/dose powder Take 17 g by mouth once daily. 510 g 0    pravastatin (Pravachol) 20 mg tablet TAKE ONE TABLET BY MOUTH EVERY DAY 90 tablet 0    tamsulosin (Flomax) 0.4 mg 24 hr capsule Take 3 days before surgery and 7 days after 10 capsule 0    traMADol (Ultram) 50 mg tablet Take 1 tablet (50 mg) by mouth every 8 hours if needed for severe pain (7 - 10). 10 tablet 0     No current facility-administered medications on file prior to visit.        Review of Systems   A comprehensive 10+ review of systems was negative except for: see hpi          Physical Exam                                                                                                                      General: Well developed, well nourished, alert and cooperative, appears in no acute distress  Eyes: no proptosis  Lungs: Breathing is easy, non-labored while speaking in clear and complete sentences.   Neuro: alert and oriented to person, place and time  Psych: Demonstrates good judgement and reason, without hallucinations, abnormal affect or abnormal behaviors.  Skin: no obvious lesions, no rashes      Labs  N/A    Imaging  N/A    IMPRESSION AND PLAN:  Jackie Matthews is a 83 y.o.  presents with POP, FERNY.      uterovaginal prolapse:  -colpocleisis and posterior repair with Dr. Lee on 6/12/2024      FERNY/OAB: Urge dominant   -UDS demonstrates no evidence of stress incontinence, she will not need a sling  -Will reassess bladder function after surgery   -She is not leaking urine and very happy with the surgery    Follow-up  with Dr. Lee for your 6-week postop visit on 7/30    All questions and concerns were answered and addressed.  The patient expressed understanding and agrees with the plan.     Reviewed and approved by PRAVIN MATHEW on 6/25/24 at 8:25 AM.

## 2024-06-26 ENCOUNTER — APPOINTMENT (OUTPATIENT)
Dept: UROLOGY | Facility: CLINIC | Age: 84
End: 2024-06-26
Payer: MEDICARE

## 2024-06-26 DIAGNOSIS — N81.9 FEMALE GENITAL PROLAPSE, UNSPECIFIED TYPE: ICD-10-CM

## 2024-06-26 DIAGNOSIS — Z48.89 POSTOPERATIVE VISIT: Primary | ICD-10-CM

## 2024-06-26 DIAGNOSIS — N95.8 GENITOURINARY SYNDROME OF MENOPAUSE: ICD-10-CM

## 2024-06-26 PROCEDURE — 99024 POSTOP FOLLOW-UP VISIT: CPT

## 2024-06-26 PROCEDURE — 1159F MED LIST DOCD IN RCRD: CPT

## 2024-06-26 PROCEDURE — 1036F TOBACCO NON-USER: CPT

## 2024-06-26 PROCEDURE — 1160F RVW MEDS BY RX/DR IN RCRD: CPT

## 2024-06-26 RX ORDER — ESTRADIOL 0.1 MG/G
CREAM VAGINAL
Qty: 42.5 G | Refills: 12 | Status: SHIPPED | OUTPATIENT
Start: 2024-06-26

## 2024-06-29 DIAGNOSIS — I10 PRIMARY HYPERTENSION: ICD-10-CM

## 2024-07-01 RX ORDER — AMLODIPINE BESYLATE 10 MG/1
10 TABLET ORAL DAILY
Qty: 90 TABLET | Refills: 0 | Status: SHIPPED | OUTPATIENT
Start: 2024-07-01

## 2024-07-30 ENCOUNTER — APPOINTMENT (OUTPATIENT)
Dept: UROLOGY | Facility: CLINIC | Age: 84
End: 2024-07-30
Payer: MEDICARE

## 2024-07-30 VITALS
DIASTOLIC BLOOD PRESSURE: 81 MMHG | WEIGHT: 120 LBS | BODY MASS INDEX: 21.26 KG/M2 | SYSTOLIC BLOOD PRESSURE: 163 MMHG | HEIGHT: 63 IN | HEART RATE: 74 BPM

## 2024-07-30 DIAGNOSIS — Z09 POSTOP CHECK: Primary | ICD-10-CM

## 2024-07-30 PROCEDURE — 1159F MED LIST DOCD IN RCRD: CPT | Performed by: STUDENT IN AN ORGANIZED HEALTH CARE EDUCATION/TRAINING PROGRAM

## 2024-07-30 PROCEDURE — 3077F SYST BP >= 140 MM HG: CPT | Performed by: STUDENT IN AN ORGANIZED HEALTH CARE EDUCATION/TRAINING PROGRAM

## 2024-07-30 PROCEDURE — 99024 POSTOP FOLLOW-UP VISIT: CPT | Performed by: STUDENT IN AN ORGANIZED HEALTH CARE EDUCATION/TRAINING PROGRAM

## 2024-07-30 PROCEDURE — 3079F DIAST BP 80-89 MM HG: CPT | Performed by: STUDENT IN AN ORGANIZED HEALTH CARE EDUCATION/TRAINING PROGRAM

## 2024-07-30 NOTE — PROGRESS NOTES
HISTORY OF PRESENT ILLNESS:  Jackie Matthews is a 83 y.o. female who presents today for a follow up visit. She reports she is doing well. She denies any burning, leakage, bulge, urgency, or frequency.          Past Medical History  She has a past medical history of Personal history of (healed) traumatic fracture (05/06/2019), Personal history of other diseases of the circulatory system, Personal history of other diseases of the musculoskeletal system and connective tissue, Personal history of other diseases of the respiratory system, Personal history of other diseases of the respiratory system, Personal history of other endocrine, nutritional and metabolic disease, Personal history of other specified conditions, and Personal history of other specified conditions.    Surgical History  She has a past surgical history that includes Total knee arthroplasty (01/08/2017); Cataract extraction (01/08/2017); Other surgical history (08/12/2021); and Other surgical history (08/12/2021).     Social History  She reports that she quit smoking about 16 years ago. Her smoking use included cigarettes. She started smoking about 66 years ago. She has a 12.5 pack-year smoking history. She has been exposed to tobacco smoke. She has never used smokeless tobacco. She reports current alcohol use of about 3.0 standard drinks of alcohol per week. She reports that she does not use drugs.    Family History  Family History   Problem Relation Name Age of Onset    Alzheimer's disease Mother      Other (Sepsis) Mother      Dementia Father      Dementia Sister      Sleep apnea Brother          Allergies  Losartan and Sulfa (sulfonamide antibiotics)      A comprehensive 10+ review of systems was negative except for: see hpi                          PHYSICAL EXAMINATION:  BP Readings from Last 3 Encounters:   07/30/24 163/81   06/12/24 137/69   06/04/24 155/76      Wt Readings from Last 3 Encounters:   07/30/24 54.4 kg (120 lb)   06/12/24 57.1 kg  "(125 lb 14.1 oz)   24 57 kg (125 lb 10.6 oz)      BMI: Estimated body mass index is 21.26 kg/m² as calculated from the following:    Height as of this encounter: 1.6 m (5' 3\").    Weight as of this encounter: 54.4 kg (120 lb).  BSA: Estimated body surface area is 1.56 meters squared as calculated from the following:    Height as of this encounter: 1.6 m (5' 3\").    Weight as of this encounter: 54.4 kg (120 lb).  HEENT: Normocephalic, atraumatic, PER EOMI, nonicteric, trachea normal, thyroid normal, oropharynx normal.  CARDIAC: regular rate & rhythm, S1 & S2 normal.  No heaves, thrills, gallops or murmurs.  LUNGS: Clear to auscultation, no spinal or CV tenderness.  EXTREMITIES: No evidence of cyanosis, clubbing or edema.       Stage 0 POP         Assessment:  83 y.o.  presents with POP, FERNY       uterovaginal prolapse:  S/P colpocleisis and posterior repair on 2024, doing well         FERNY/OAB: Urge dominant   UDS demonstrates no evidence of stress incontinence, she will not need a sling     S/P colpocleisis and posterior repair on 2024 , doing well       Follow up virtually in in 1 year       All questions and concerns were answered and addressed.  The patient expressed understanding and agrees with the plan.     Fernando Lee MD    Scribe Attestation  By signing my name below, I, Karly Sanderson, [place]   attest that this documentation has been prepared under the direction and in the presence of Fernando Lee MD.  "

## 2024-08-02 ENCOUNTER — APPOINTMENT (OUTPATIENT)
Dept: UROLOGY | Facility: CLINIC | Age: 84
End: 2024-08-02
Payer: MEDICARE

## 2024-08-31 DIAGNOSIS — M85.80 OSTEOPENIA, UNSPECIFIED LOCATION: ICD-10-CM

## 2024-09-03 RX ORDER — ALENDRONATE SODIUM 70 MG/1
TABLET ORAL
Qty: 12 TABLET | Refills: 0 | Status: SHIPPED | OUTPATIENT
Start: 2024-09-03

## 2024-09-26 DIAGNOSIS — I10 PRIMARY HYPERTENSION: ICD-10-CM

## 2024-09-27 RX ORDER — AMLODIPINE BESYLATE 10 MG/1
10 TABLET ORAL DAILY
Qty: 90 TABLET | Refills: 0 | Status: SHIPPED | OUTPATIENT
Start: 2024-09-27

## 2024-10-04 ENCOUNTER — APPOINTMENT (OUTPATIENT)
Dept: PRIMARY CARE | Facility: CLINIC | Age: 84
End: 2024-10-04
Payer: MEDICARE

## 2024-10-04 VITALS
SYSTOLIC BLOOD PRESSURE: 120 MMHG | BODY MASS INDEX: 24.02 KG/M2 | DIASTOLIC BLOOD PRESSURE: 80 MMHG | WEIGHT: 127.23 LBS | HEIGHT: 61 IN

## 2024-10-04 DIAGNOSIS — Z78.0 POST-MENOPAUSAL: Primary | ICD-10-CM

## 2024-10-04 DIAGNOSIS — I65.23 ATHEROSCLEROSIS OF BOTH CAROTID ARTERIES: ICD-10-CM

## 2024-10-04 DIAGNOSIS — Z23 IMMUNIZATION DUE: ICD-10-CM

## 2024-10-04 DIAGNOSIS — I10 PRIMARY HYPERTENSION: ICD-10-CM

## 2024-10-04 DIAGNOSIS — Z00.00 WELL ADULT EXAM: ICD-10-CM

## 2024-10-04 DIAGNOSIS — R41.3 MEMORY LOSS: ICD-10-CM

## 2024-10-04 PROBLEM — E46 PROTEIN-CALORIE MALNUTRITION, UNSPECIFIED SEVERITY (MULTI): Status: RESOLVED | Noted: 2023-03-08 | Resolved: 2024-10-04

## 2024-10-04 PROBLEM — R06.02 SOB (SHORTNESS OF BREATH) ON EXERTION: Status: RESOLVED | Noted: 2023-02-11 | Resolved: 2024-10-04

## 2024-10-04 ASSESSMENT — PATIENT HEALTH QUESTIONNAIRE - PHQ9
2. FEELING DOWN, DEPRESSED OR HOPELESS: NOT AT ALL
1. LITTLE INTEREST OR PLEASURE IN DOING THINGS: SEVERAL DAYS
SUM OF ALL RESPONSES TO PHQ9 QUESTIONS 1 AND 2: 0
2. FEELING DOWN, DEPRESSED OR HOPELESS: SEVERAL DAYS
1. LITTLE INTEREST OR PLEASURE IN DOING THINGS: NOT AT ALL
SUM OF ALL RESPONSES TO PHQ9 QUESTIONS 1 AND 2: 2

## 2024-10-04 ASSESSMENT — ACTIVITIES OF DAILY LIVING (ADL)
DOING_HOUSEWORK: INDEPENDENT
BATHING: INDEPENDENT
TAKING_MEDICATION: INDEPENDENT
GROCERY_SHOPPING: INDEPENDENT
MANAGING_FINANCES: INDEPENDENT
DRESSING: INDEPENDENT

## 2024-10-04 NOTE — PROGRESS NOTES
"Subjective   Reason for Visit: Jackie Matthews is an 83 y.o. female here for follow-up and  Medicare Wellness visit.     Past Medical, Surgical, and Family History reviewed and updated in chart.    Reviewed all medications by prescribing practitioner or clinical pharmacist (such as prescriptions, OTCs, herbal therapies and supplements) and documented in the medical record.    HPI    Overall well.  Denies any pain.  No shortness of breath.  No active issues.  No recent falls.  Mood has been good.  Has not followed up with pulmonology.  Denies any cough chest pain shortness of breath or wheezing.  Does admit to some issues with memory.  Subtle and not interfering with day-to-day activities.  Continue to use alcohol daily.  Vague on amount.  No abdominal pain.  Bowels working well.  No difficulties urinating.  Little regular exercise.  Active with family.  Patient Care Team:  Ana Rosa Brunner MD as PCP - General  Ana Rosa Brunner MD as PCP - O Medicare Advantage PCP     Review of Systems  All systems reviewed and negative except as per history of present illness  Objective   Vitals:  /80   Ht 1.556 m (5' 1.25\")   Wt 57.7 kg (127 lb 3.7 oz)   BMI 23.84 kg/m²       Physical Exam  No acute distress.  Well appearing.  Alert and oriented ×3.  Oropharynx/nasopharynx clear.  Neck supple without cervical adenopathy.  Lungs clear to auscultation bilaterally.  Heart regular without murmurs, rubs, gallops.  Abdomen soft with normal active bowel sounds.  No masses or hepatosplenomegaly appreciated.  Extremities without cyanosis, clubbing, or edema  Assessment & Plan  Immunization due    Orders:    Flu vaccine, trivalent, preservative free, HIGH-DOSE, age 65y+ (Fluzone)                She is overdue for follow-up CT scan from her pulmonologist for pulmonary nodules.  Spent significant time reviewing importance of this with patient.  Reviewed importance of this follow-up.  Discussed differential diagnosis including lung " cancer.  Provided patient with phone number to schedule CT scan of her chest as well as phone number to schedule appointment with pulmonologist.  Again, spent time reviewing importance of this with patient.  Had her walk to [facility] office leaving office to schedule appointments  #1 PNA-remote.  Resolved.   no s/sx.  #2 hyponatremia- normal on last test, recheck.    #3 hypomag- normal last test.  Recheck  #4 COPD-albuterol as needed.  Continue LABA.  Continue Advair. Follow-up pulmonology, asked pt to sched f/u  appt-reviewed with patient at length  #5 EtOH-reviewed importance of cessation.  Spent time discussing.  #6 thrombocytosis- resolved last test, retest  #7 hearing loss/nasal obstruction-consult ENT  #8 severe JACLYN- f/u sleep Med, stressed importance and risk of untreated JACLYN.  Using CPAP at bedtime.  Reviewed importance again  #9 dysgusia-reviewed.  Recommend ENT evaluation, patient declines.     #1 osteoporosis-began rx 11/18. reviewed. Given fracture with osteopenia => osteoporosis. Discussed options. We will con't alendronate and continue for 5 years. Spent significant time reviewing risks of this medicine including insufficiency fracture. retest DEXA , consider stopping pending results  #2 impaired fasting blood sugar-reviewed at length. Stressed importance of routine regular exercise, low sugar reduced carbohydrate diet. Recheck   #3 vitamin D deficiency- vitamin D 2000 units over-the-counter daily. Reviewed  #4 gout-rare episodes. Treated with colchicine. Consider allopurinol in the future.  #5 hyperlipidemia- fair. Diet and exercise. Retest  #6 carotid artery stenosis- follow-up scan, order reentered.  Stressed importance with patient.  #7 hypertension-good control.   #8 left bundle branch block-normal stress test   #9 painful foot/hammer toe- c/s ortho  #10 h/o pelvic fx- resolved. no pain.   #11  Pulmonary nodule-status post antibiotics.  Follow-up CT scan as per pulmonology.    #12  hyponatremia-resolved  #13 palpitations- resolved.   #14 mild AI- f/u echo next visit  #15 memory impairment-  remains an issue.  Rec NO  EtOH. rec neuro eval again,  information to pt, reviewed importance.  Has order at home for shingles vaccine--> reviewed importance  Last mammogram 5/22-- orders in chart, reviewed  f/u 3 mth

## 2024-10-04 NOTE — PATIENT INSTRUCTIONS
Please have lab works as we discussed.  I would recommend you schedule the lung CAT scan that Dr. Shahid ordered ASAP, I feel this is very important.  Additionally, I would recommend you see a neurologist to review your memory.    Come back to see me in 4 to 5 months.

## 2024-10-05 ASSESSMENT — ACTIVITIES OF DAILY LIVING (ADL)
DOING_HOUSEWORK: INDEPENDENT
GROCERY_SHOPPING: INDEPENDENT
DRESSING: INDEPENDENT
TAKING_MEDICATION: INDEPENDENT
MANAGING_FINANCES: INDEPENDENT
BATHING: INDEPENDENT

## 2024-10-05 ASSESSMENT — PATIENT HEALTH QUESTIONNAIRE - PHQ9
SUM OF ALL RESPONSES TO PHQ9 QUESTIONS 1 AND 2: 0
1. LITTLE INTEREST OR PLEASURE IN DOING THINGS: NOT AT ALL
2. FEELING DOWN, DEPRESSED OR HOPELESS: NOT AT ALL

## 2024-10-08 ENCOUNTER — HOSPITAL ENCOUNTER (OUTPATIENT)
Dept: RADIOLOGY | Facility: CLINIC | Age: 84
Discharge: HOME | End: 2024-10-08
Payer: MEDICARE

## 2024-10-08 DIAGNOSIS — Z78.0 POST-MENOPAUSAL: ICD-10-CM

## 2024-10-08 PROCEDURE — 77080 DXA BONE DENSITY AXIAL: CPT

## 2024-10-08 PROCEDURE — 77080 DXA BONE DENSITY AXIAL: CPT | Performed by: RADIOLOGY

## 2024-10-21 ENCOUNTER — APPOINTMENT (OUTPATIENT)
Dept: RADIOLOGY | Facility: CLINIC | Age: 84
End: 2024-10-21
Payer: MEDICARE

## 2024-10-22 ENCOUNTER — HOSPITAL ENCOUNTER (OUTPATIENT)
Dept: RADIOLOGY | Facility: CLINIC | Age: 84
Discharge: HOME | End: 2024-10-22
Payer: MEDICARE

## 2024-10-22 DIAGNOSIS — R91.1 LUNG NODULE: ICD-10-CM

## 2024-10-22 PROCEDURE — 71250 CT THORAX DX C-: CPT | Performed by: RADIOLOGY

## 2024-10-22 PROCEDURE — 71250 CT THORAX DX C-: CPT

## 2024-10-31 ENCOUNTER — OFFICE VISIT (OUTPATIENT)
Dept: PRIMARY CARE | Facility: CLINIC | Age: 84
End: 2024-10-31
Payer: MEDICARE

## 2024-10-31 VITALS
WEIGHT: 128 LBS | BODY MASS INDEX: 23.99 KG/M2 | TEMPERATURE: 97.7 F | SYSTOLIC BLOOD PRESSURE: 150 MMHG | DIASTOLIC BLOOD PRESSURE: 90 MMHG

## 2024-10-31 DIAGNOSIS — B02.9 HERPES ZOSTER WITHOUT COMPLICATION: Primary | ICD-10-CM

## 2024-10-31 PROCEDURE — 99214 OFFICE O/P EST MOD 30 MIN: CPT | Performed by: NURSE PRACTITIONER

## 2024-10-31 PROCEDURE — 1159F MED LIST DOCD IN RCRD: CPT | Performed by: NURSE PRACTITIONER

## 2024-10-31 PROCEDURE — 3080F DIAST BP >= 90 MM HG: CPT | Performed by: NURSE PRACTITIONER

## 2024-10-31 PROCEDURE — 1160F RVW MEDS BY RX/DR IN RCRD: CPT | Performed by: NURSE PRACTITIONER

## 2024-10-31 PROCEDURE — 1036F TOBACCO NON-USER: CPT | Performed by: NURSE PRACTITIONER

## 2024-10-31 PROCEDURE — 1123F ACP DISCUSS/DSCN MKR DOCD: CPT | Performed by: NURSE PRACTITIONER

## 2024-10-31 PROCEDURE — 3077F SYST BP >= 140 MM HG: CPT | Performed by: NURSE PRACTITIONER

## 2024-10-31 RX ORDER — FAMCICLOVIR 500 MG/1
500 TABLET ORAL 3 TIMES DAILY
Qty: 21 TABLET | Refills: 0 | Status: SHIPPED | OUTPATIENT
Start: 2024-10-31 | End: 2024-11-07

## 2024-10-31 ASSESSMENT — ENCOUNTER SYMPTOMS
MUSCULOSKELETAL NEGATIVE: 1
CONSTITUTIONAL NEGATIVE: 1
ROS SKIN COMMENTS: AS NOTED IN HPI
CARDIOVASCULAR NEGATIVE: 1
PSYCHIATRIC NEGATIVE: 1
RESPIRATORY NEGATIVE: 1
NEUROLOGICAL NEGATIVE: 1

## 2024-10-31 ASSESSMENT — PATIENT HEALTH QUESTIONNAIRE - PHQ9
1. LITTLE INTEREST OR PLEASURE IN DOING THINGS: NOT AT ALL
2. FEELING DOWN, DEPRESSED OR HOPELESS: NOT AT ALL
SUM OF ALL RESPONSES TO PHQ9 QUESTIONS 1 AND 2: 0

## 2024-11-01 DIAGNOSIS — J44.9 CHRONIC OBSTRUCTIVE PULMONARY DISEASE, UNSPECIFIED COPD TYPE (MULTI): ICD-10-CM

## 2024-11-04 RX ORDER — ALBUTEROL SULFATE 90 UG/1
INHALANT RESPIRATORY (INHALATION)
Qty: 25.5 G | Refills: 0 | Status: SHIPPED | OUTPATIENT
Start: 2024-11-04

## 2024-11-04 RX ORDER — FLUTICASONE PROPIONATE AND SALMETEROL 250; 50 UG/1; UG/1
1 POWDER RESPIRATORY (INHALATION) 2 TIMES DAILY
Qty: 3 EACH | Refills: 2 | Status: SHIPPED | OUTPATIENT
Start: 2024-11-04

## 2024-11-09 DIAGNOSIS — E78.5 HYPERLIPIDEMIA, UNSPECIFIED HYPERLIPIDEMIA TYPE: ICD-10-CM

## 2024-11-09 DIAGNOSIS — I10 PRIMARY HYPERTENSION: ICD-10-CM

## 2024-11-11 RX ORDER — METOPROLOL SUCCINATE 25 MG/1
12.5 TABLET, EXTENDED RELEASE ORAL DAILY
Qty: 45 TABLET | Refills: 0 | Status: SHIPPED | OUTPATIENT
Start: 2024-11-11

## 2024-11-11 RX ORDER — PRAVASTATIN SODIUM 20 MG/1
TABLET ORAL
Qty: 90 TABLET | Refills: 0 | Status: SHIPPED | OUTPATIENT
Start: 2024-11-11

## 2024-11-18 ENCOUNTER — APPOINTMENT (OUTPATIENT)
Dept: PULMONOLOGY | Facility: CLINIC | Age: 84
End: 2024-11-18
Payer: MEDICARE

## 2024-12-31 DIAGNOSIS — I10 PRIMARY HYPERTENSION: ICD-10-CM

## 2024-12-31 DIAGNOSIS — M85.80 OSTEOPENIA, UNSPECIFIED LOCATION: ICD-10-CM

## 2024-12-31 RX ORDER — ALENDRONATE SODIUM 70 MG/1
70 TABLET ORAL
Qty: 12 TABLET | Refills: 0 | Status: SHIPPED | OUTPATIENT
Start: 2024-12-31

## 2024-12-31 RX ORDER — AMLODIPINE BESYLATE 10 MG/1
10 TABLET ORAL DAILY
Qty: 90 TABLET | Refills: 0 | Status: SHIPPED | OUTPATIENT
Start: 2024-12-31

## 2025-01-30 DIAGNOSIS — I10 PRIMARY HYPERTENSION: ICD-10-CM

## 2025-01-30 DIAGNOSIS — E78.5 HYPERLIPIDEMIA, UNSPECIFIED HYPERLIPIDEMIA TYPE: ICD-10-CM

## 2025-01-30 RX ORDER — PRAVASTATIN SODIUM 20 MG/1
TABLET ORAL
Qty: 90 TABLET | Refills: 0 | Status: SHIPPED | OUTPATIENT
Start: 2025-01-30

## 2025-01-30 RX ORDER — METOPROLOL SUCCINATE 25 MG/1
12.5 TABLET, EXTENDED RELEASE ORAL DAILY
Qty: 45 TABLET | Refills: 0 | Status: SHIPPED | OUTPATIENT
Start: 2025-01-30

## 2025-02-07 ENCOUNTER — APPOINTMENT (OUTPATIENT)
Dept: PRIMARY CARE | Facility: CLINIC | Age: 85
End: 2025-02-07
Payer: MEDICARE

## 2025-02-07 VITALS
WEIGHT: 126.6 LBS | BODY MASS INDEX: 23.9 KG/M2 | SYSTOLIC BLOOD PRESSURE: 126 MMHG | HEIGHT: 61 IN | DIASTOLIC BLOOD PRESSURE: 80 MMHG

## 2025-02-07 DIAGNOSIS — I10 HYPERTENSION, UNSPECIFIED TYPE: ICD-10-CM

## 2025-02-07 DIAGNOSIS — R91.8 GROUND GLASS OPACITY PRESENT ON IMAGING OF LUNG: Primary | ICD-10-CM

## 2025-02-07 DIAGNOSIS — J43.9 PULMONARY EMPHYSEMA, UNSPECIFIED EMPHYSEMA TYPE (MULTI): ICD-10-CM

## 2025-02-07 DIAGNOSIS — R73.09 ELEVATED GLUCOSE: ICD-10-CM

## 2025-02-07 DIAGNOSIS — E83.42 HYPOMAGNESEMIA: ICD-10-CM

## 2025-02-07 PROCEDURE — 3079F DIAST BP 80-89 MM HG: CPT | Performed by: INTERNAL MEDICINE

## 2025-02-07 PROCEDURE — 3074F SYST BP LT 130 MM HG: CPT | Performed by: INTERNAL MEDICINE

## 2025-02-07 PROCEDURE — G2211 COMPLEX E/M VISIT ADD ON: HCPCS | Performed by: INTERNAL MEDICINE

## 2025-02-07 PROCEDURE — 1123F ACP DISCUSS/DSCN MKR DOCD: CPT | Performed by: INTERNAL MEDICINE

## 2025-02-07 PROCEDURE — 99214 OFFICE O/P EST MOD 30 MIN: CPT | Performed by: INTERNAL MEDICINE

## 2025-02-07 PROCEDURE — 1036F TOBACCO NON-USER: CPT | Performed by: INTERNAL MEDICINE

## 2025-02-07 PROCEDURE — 1159F MED LIST DOCD IN RCRD: CPT | Performed by: INTERNAL MEDICINE

## 2025-02-07 NOTE — PROGRESS NOTES
Subjective   Reason for Visit: Jackie Matthews is an 84 y.o. female here for follow-up and  Medicare Wellness visit.     Past Medical, Surgical, and Family History reviewed and updated in chart.         HPI  Overall well   S/p eval w/ pulm, CT ok   memory remains an issue- she cancelled appt w/ neuro  #1 osteoporosis-status post treatment with Fosamax for 5 years.  Bone density 12/24-stable.  Alendronate discontinued for now.  #2 impaired fasting blood sugar-trying to keep sugar limited in diet.  Some exercise.  #3 vitamin D deficiency-remains on vitamin D 2000 units over-the-counter daily.    #4 gout-no issues recently  #5 hyperlipidemia-diet fair  #6 carotid artery stenosis-no active issues  #7 hypertension-no headache chest pain dizziness.  No lightheadedness.  #8 left bundle branch block-normal stress test        Patient Care Team:  Ana Rosa Brunner MD as PCP - General  Ana Rosa Brunner MD as PCP - O Medicare Advantage PCP     Review of Systems  All systems reviewed and negative except as per history of present illness  Objective   Vitals:  There were no vitals taken for this visit.      Physical Exam  No acute distress.  Well appearing.  Alert and oriented ×3.  Oropharynx/nasopharynx clear.  Neck supple without cervical adenopathy.  Lungs clear to auscultation bilaterally.  Heart regular without murmurs, rubs, gallops.  Abdomen soft with normal active bowel sounds.  No masses or hepatosplenomegaly appreciated.  Extremities without cyanosis, clubbing, or edema    Lab Results   Component Value Date    WBC 8.5 06/04/2024    HGB 12.6 06/04/2024    HCT 38.1 06/04/2024     06/04/2024    CHOL 199 09/22/2023    TRIG 76 09/22/2023    HDL 96.3 09/22/2023    ALT 16 09/22/2023    AST 18 01/17/2023     (L) 06/04/2024    K 4.6 06/04/2024    CL 96 (L) 06/04/2024    CREATININE 0.86 06/04/2024    BUN 22 06/04/2024    CO2 29 06/04/2024    TSH 1.11 01/17/2023    PSA <0.10 01/26/2021    HGBA1C 5.6 09/22/2023      ===  10/22/24 ===    CT CHEST WO IV CONTRAST    - Impression -  No evidence of an acute intrathoracic process. Stable 6 mm nodule  within the right lower lobe. Stable additional findings as described  above.    MACRO:  None.    Signed by: See Kebede 10/24/2024 4:46 PM  Dictation workstation:   [id number]    Assessment & Plan  Pulmonary emphysema, unspecified emphysema type (Multi)                       #1 PNA-remote.  Resolved.   no s/sx.  #2 hyponatremia- normal on last test, recheck.    #3 hypomag- normal last test.  Recheck  #4 COPD-albuterol as needed.  Continue LABA.  Continue Advair. Follow-up pulmonology, asked pt to sched f/u  appt   #5 EtOH-reviewed importance of cessation.  Spent time discussing.  #6 thrombocytosis- resolved last test, retest  #7 hearing loss/nasal obstruction-consult ENT  #8 severe JACLYN- f/u sleep Med, stressed importance and risk of untreated JACLYN.  Using CPAP at bedtime.  Reviewed importance again  #9 dysgusia-reviewed.  Recommend ENT evaluation, patient declines.     #1 osteoporosis-began rx 11/18. reviewed. Given fracture with osteopenia => osteoporosis. On Rx for 5 years.  Off since 12/24.  Reviewed need for vit d/Ca  #2 impaired fasting blood sugar-reviewed at length. Stressed importance of routine regular exercise, low sugar reduced carbohydrate diet. Recheck - labs now  #3 vitamin D deficiency resume vitamin D 2000 units over-the-counter daily. Reviewed  #4 gout-rare episodes. Treated with colchicine. Consider allopurinol in the future.  #5 hyperlipidemia- fair. Diet and exercise. Retest  #6 carotid artery stenosis- follow-up scan, order reentered.  Stressed importance with patient.  #7 hypertension-good control.   #8 left bundle branch block-normal stress test   #9 painful foot/hammer toe- c/s ortho  #10 h/o pelvic fx- resolved. no pain.   #11  Pulmonary nodule-status post antibiotics.  Follow-up CT scan as per pulmonology.    #12 hyponatremia-resolved.  retest  #13 palpitations-  resolved.   #14 mild AI- f/u echo next visit  #15 memory impairment-  remains an issue.  Rec NO  EtOH. rec neuro eval again- reviewed,  reviewed safety  Has order at home for shingles vaccine--> reviewed importance  Last mammogram 2/24-- >she declines  f/u 3 mth  Rec RSV vaccine

## 2025-02-08 LAB
ALT SERPL-CCNC: 14 U/L (ref 6–29)
ANION GAP SERPL CALCULATED.4IONS-SCNC: 13 MMOL/L (CALC) (ref 7–17)
BUN SERPL-MCNC: 19 MG/DL (ref 7–25)
BUN/CREAT SERPL: ABNORMAL (CALC) (ref 6–22)
CALCIUM SERPL-MCNC: 10.1 MG/DL (ref 8.6–10.4)
CHLORIDE SERPL-SCNC: 97 MMOL/L (ref 98–110)
CHOLEST SERPL-MCNC: 210 MG/DL
CHOLEST/HDLC SERPL: 2.3 (CALC)
CO2 SERPL-SCNC: 26 MMOL/L (ref 20–32)
CREAT SERPL-MCNC: 0.76 MG/DL (ref 0.6–0.95)
EGFRCR SERPLBLD CKD-EPI 2021: 77 ML/MIN/1.73M2
ERYTHROCYTE [DISTWIDTH] IN BLOOD BY AUTOMATED COUNT: 12.2 % (ref 11–15)
EST. AVERAGE GLUCOSE BLD GHB EST-MCNC: 108 MG/DL
EST. AVERAGE GLUCOSE BLD GHB EST-SCNC: 6 MMOL/L
GLUCOSE SERPL-MCNC: 88 MG/DL (ref 65–99)
HBA1C MFR BLD: 5.4 % OF TOTAL HGB
HCT VFR BLD AUTO: 41 % (ref 35–45)
HDLC SERPL-MCNC: 91 MG/DL
HGB BLD-MCNC: 13.8 G/DL (ref 11.7–15.5)
LDLC SERPL CALC-MCNC: 101 MG/DL (CALC)
MCH RBC QN AUTO: 30.1 PG (ref 27–33)
MCHC RBC AUTO-ENTMCNC: 33.7 G/DL (ref 32–36)
MCV RBC AUTO: 89.3 FL (ref 80–100)
NONHDLC SERPL-MCNC: 119 MG/DL (CALC)
PLATELET # BLD AUTO: 352 THOUSAND/UL (ref 140–400)
PMV BLD REES-ECKER: 10.3 FL (ref 7.5–12.5)
POTASSIUM SERPL-SCNC: 4.8 MMOL/L (ref 3.5–5.3)
RBC # BLD AUTO: 4.59 MILLION/UL (ref 3.8–5.1)
SODIUM SERPL-SCNC: 136 MMOL/L (ref 135–146)
TRIGL SERPL-MCNC: 90 MG/DL
TSH SERPL-ACNC: 0.98 MIU/L (ref 0.4–4.5)
WBC # BLD AUTO: 7.4 THOUSAND/UL (ref 3.8–10.8)

## 2025-03-21 ENCOUNTER — APPOINTMENT (OUTPATIENT)
Dept: NEUROLOGY | Facility: HOSPITAL | Age: 85
End: 2025-03-21
Payer: MEDICARE

## 2025-04-04 DIAGNOSIS — I10 PRIMARY HYPERTENSION: ICD-10-CM

## 2025-04-04 RX ORDER — AMLODIPINE BESYLATE 10 MG/1
10 TABLET ORAL DAILY
Qty: 90 TABLET | Refills: 0 | Status: SHIPPED | OUTPATIENT
Start: 2025-04-04

## 2025-04-30 DIAGNOSIS — I10 PRIMARY HYPERTENSION: ICD-10-CM

## 2025-04-30 DIAGNOSIS — E78.5 HYPERLIPIDEMIA, UNSPECIFIED HYPERLIPIDEMIA TYPE: ICD-10-CM

## 2025-05-01 RX ORDER — PRAVASTATIN SODIUM 20 MG/1
20 TABLET ORAL DAILY
Qty: 90 TABLET | Refills: 0 | Status: SHIPPED | OUTPATIENT
Start: 2025-05-01

## 2025-05-01 RX ORDER — METOPROLOL SUCCINATE 25 MG/1
12.5 TABLET, EXTENDED RELEASE ORAL DAILY
Qty: 45 TABLET | Refills: 0 | Status: SHIPPED | OUTPATIENT
Start: 2025-05-01

## 2025-05-27 DIAGNOSIS — J44.9 CHRONIC OBSTRUCTIVE PULMONARY DISEASE, UNSPECIFIED COPD TYPE (MULTI): ICD-10-CM

## 2025-05-27 RX ORDER — ALBUTEROL SULFATE 90 UG/1
INHALANT RESPIRATORY (INHALATION)
Qty: 25.5 G | Refills: 0 | Status: SHIPPED | OUTPATIENT
Start: 2025-05-27

## 2025-07-07 DIAGNOSIS — I10 PRIMARY HYPERTENSION: ICD-10-CM

## 2025-07-07 RX ORDER — AMLODIPINE BESYLATE 10 MG/1
10 TABLET ORAL DAILY
Qty: 90 TABLET | Refills: 0 | Status: SHIPPED | OUTPATIENT
Start: 2025-07-07

## 2025-07-21 NOTE — PROGRESS NOTES
Virtual or Telephone Consent    While technically available, the patient was unable or unwilling to consent to connect via audio/video telehealth technology; therefore, I performed this visit using a real-time audio only connection between Jackie MARCELLE Matthews & Fernando Lee MD.  Verbal consent was requested and obtained from Jackie Matthews on this date, 07/29/25 for a telehealth visit and the patient's location was confirmed at the time of the visit.    HISTORY OF PRESENT ILLNESS:  Jackie Matthews is a 84 y.o. female who presents today for a virtual follow up visit. She denies pain and is doing well. She uses estrogen cream every 3 days.         Past Medical History  She has a past medical history of Personal history of (healed) traumatic fracture (05/06/2019), Personal history of other diseases of the circulatory system, Personal history of other diseases of the musculoskeletal system and connective tissue, Personal history of other diseases of the respiratory system, Personal history of other diseases of the respiratory system, Personal history of other endocrine, nutritional and metabolic disease, Personal history of other specified conditions, and Personal history of other specified conditions.    Surgical History  She has a past surgical history that includes Total knee arthroplasty (01/08/2017); Cataract extraction (01/08/2017); Other surgical history (08/12/2021); Other surgical history (08/12/2021); and Bladder suspension.     Social History  She reports that she quit smoking about 17 years ago. Her smoking use included cigarettes. She started smoking about 67 years ago. She has a 12.5 pack-year smoking history. She has been exposed to tobacco smoke. She has never used smokeless tobacco. She reports current alcohol use of about 3.0 standard drinks of alcohol per week. She reports that she does not use drugs.    Family History  Family History[1]     Allergies  Losartan and Sulfa (sulfonamide  antibiotics)      A comprehensive 10+ review of systems was negative except for: see hpi                  Assessment:  84 y.o.  presents with POP, FERNY       uterovaginal prolapse:  S/P colpocleisis and posterior repair on 2024, doing well         FERNY/OAB: Urge dominant   UDS demonstrates no evidence of stress incontinence, she will not need a sling  S/P colpocleisis and posterior repair on 2024 , doing well  -Refill given of estrogen cream      Follow up 1 year       I spent a total of 20 minutes speaking with the patient on the telephone     All questions and concerns were answered and addressed.  The patient expressed understanding and agrees with the plan.     Fernando Lee MD    Scribe Attestation  By signing my name below, Karly HOBBS Scribe   attest that this documentation has been prepared under the direction and in the presence of Fernando Lee MD.    Scribe Attestation  By signing my name below, Telma HOBBS Scribe   attest that this documentation has been prepared under the direction and in the presence of Fernando Lee MD.           [1]   Family History  Problem Relation Name Age of Onset    Alzheimer's disease Mother      Other (Sepsis) Mother      Dementia Father      Dementia Sister      Sleep apnea Brother

## 2025-07-25 DIAGNOSIS — I10 PRIMARY HYPERTENSION: ICD-10-CM

## 2025-07-25 DIAGNOSIS — E78.5 HYPERLIPIDEMIA, UNSPECIFIED HYPERLIPIDEMIA TYPE: ICD-10-CM

## 2025-07-25 RX ORDER — METOPROLOL SUCCINATE 25 MG/1
12.5 TABLET, EXTENDED RELEASE ORAL DAILY
Qty: 45 TABLET | Refills: 0 | Status: SHIPPED | OUTPATIENT
Start: 2025-07-25

## 2025-07-25 RX ORDER — PRAVASTATIN SODIUM 20 MG/1
20 TABLET ORAL DAILY
Qty: 90 TABLET | Refills: 0 | Status: SHIPPED | OUTPATIENT
Start: 2025-07-25

## 2025-07-29 ENCOUNTER — APPOINTMENT (OUTPATIENT)
Dept: UROLOGY | Facility: CLINIC | Age: 85
End: 2025-07-29
Payer: MEDICARE

## 2025-07-29 DIAGNOSIS — N95.8 GENITOURINARY SYNDROME OF MENOPAUSE: Primary | ICD-10-CM

## 2025-07-29 PROCEDURE — 99213 OFFICE O/P EST LOW 20 MIN: CPT | Performed by: STUDENT IN AN ORGANIZED HEALTH CARE EDUCATION/TRAINING PROGRAM

## 2025-07-29 RX ORDER — ESTRADIOL 0.1 MG/G
CREAM VAGINAL
Qty: 42.5 G | Refills: 12 | Status: SHIPPED | OUTPATIENT
Start: 2025-07-29

## 2026-07-28 ENCOUNTER — APPOINTMENT (OUTPATIENT)
Dept: UROLOGY | Facility: CLINIC | Age: 86
End: 2026-07-28
Payer: MEDICARE

## (undated) DEVICE — CAUTERY, PENCIL, PUSH BUTTON, SMOKE EVAC, 70MM

## (undated) DEVICE — DRAPE, UNDERBUTTOCKS

## (undated) DEVICE — Device

## (undated) DEVICE — SUTURE, VICRYL, 0, 27 IN, CT-2, UNDYED

## (undated) DEVICE — TIP, SUCTION, YANKAUER, FLEXIBLE

## (undated) DEVICE — DRAPE PACK, MINOR, CUSTOM, GEAUGA

## (undated) DEVICE — PREP TRAY, SKIN, DRY, W/GLOVES

## (undated) DEVICE — STAY SET, SURGICAL , 5MM SHARP HOOK, CS/ 50

## (undated) DEVICE — TRAY, FOLEY, ADVANCE, 16FR, SILICONE, W/STATLOCK

## (undated) DEVICE — DRAPE PACK, LAVH, W/ATTACHED LEGGINGS, W/POUCH, 100 X 114 IN, LF, STERILE

## (undated) DEVICE — RETRACTOR, SURGICAL, RING, PLASTIC, DISPOSABLE

## (undated) DEVICE — SUTURE, MONOCRYL, 3-0, 27 IN, SH, UNDYED

## (undated) DEVICE — POSITIONING, THE PINK PAD, PIGAZZI SYSTEM

## (undated) DEVICE — PAD, SANITARY, OBSTETRICAL, W/ADHESIVE STRIP, WING, 11 IN, NS

## (undated) DEVICE — DRAPE, LEGGINGS, 48 X 31 IN, STERILE, LF

## (undated) DEVICE — IRRIGATION SET, CYSTOSCOPY, TURP, Y, CONTINUOUS, 81 IN

## (undated) DEVICE — SUTURE, VICRYL, 3-0, 27 IN, CT-2, UNDYED

## (undated) DEVICE — NEEDLE, SAFETY, 21 G X 1 IN